# Patient Record
Sex: FEMALE | Race: WHITE | NOT HISPANIC OR LATINO | Employment: FULL TIME | URBAN - METROPOLITAN AREA
[De-identification: names, ages, dates, MRNs, and addresses within clinical notes are randomized per-mention and may not be internally consistent; named-entity substitution may affect disease eponyms.]

---

## 2023-07-05 DIAGNOSIS — I10 HYPERTENSION, ESSENTIAL: ICD-10-CM

## 2023-07-05 PROBLEM — Z00.00 ROUTINE ADULT HEALTH MAINTENANCE: Status: ACTIVE | Noted: 2023-07-05

## 2023-07-05 PROBLEM — Z86.010 HISTORY OF COLON POLYPS: Status: ACTIVE | Noted: 2023-07-05

## 2023-07-05 PROBLEM — K57.30 DIVERTICULOSIS OF COLON: Status: ACTIVE | Noted: 2023-07-05

## 2023-07-05 PROBLEM — Z86.0100 HISTORY OF COLON POLYPS: Status: ACTIVE | Noted: 2023-07-05

## 2023-07-05 PROBLEM — G43.909 MIGRAINE WITHOUT STATUS MIGRAINOSUS, NOT INTRACTABLE: Status: ACTIVE | Noted: 2023-07-05

## 2023-07-05 PROBLEM — Z86.39 H/O THYROID NODULE: Status: ACTIVE | Noted: 2023-07-05

## 2023-07-05 PROBLEM — E11.9 DM (DIABETES MELLITUS) (MULTI): Status: ACTIVE | Noted: 2023-07-05

## 2023-07-05 PROBLEM — E11.9 TYPE 2 DIABETES MELLITUS WITHOUT COMPLICATION, WITHOUT LONG-TERM CURRENT USE OF INSULIN (MULTI): Status: ACTIVE | Noted: 2023-07-05

## 2023-07-05 PROBLEM — D21.9 LEIOMYOMA: Status: ACTIVE | Noted: 2023-07-05

## 2023-07-05 PROBLEM — E88.810 METABOLIC SYNDROME: Status: ACTIVE | Noted: 2023-07-05

## 2023-07-05 PROBLEM — M17.12 OSTEOARTHRITIS OF LEFT KNEE: Status: ACTIVE | Noted: 2023-07-05

## 2023-07-05 PROBLEM — E66.01 CLASS 2 SEVERE OBESITY DUE TO EXCESS CALORIES WITH SERIOUS COMORBIDITY AND BODY MASS INDEX (BMI) OF 35.0 TO 35.9 IN ADULT (MULTI): Status: ACTIVE | Noted: 2023-07-05

## 2023-07-05 PROBLEM — I51.9 DIASTOLIC DYSFUNCTION, LEFT VENTRICLE: Status: ACTIVE | Noted: 2023-07-05

## 2023-07-05 PROBLEM — R73.01 IMPAIRED FASTING GLUCOSE: Status: ACTIVE | Noted: 2023-07-05

## 2023-07-05 PROBLEM — K76.0 FATTY LIVER: Status: ACTIVE | Noted: 2023-07-05

## 2023-07-05 PROBLEM — E66.812 CLASS 2 SEVERE OBESITY DUE TO EXCESS CALORIES WITH SERIOUS COMORBIDITY AND BODY MASS INDEX (BMI) OF 35.0 TO 35.9 IN ADULT: Status: ACTIVE | Noted: 2023-07-05

## 2023-07-05 PROBLEM — K64.0 GRADE I HEMORRHOIDS: Status: ACTIVE | Noted: 2023-07-05

## 2023-07-05 PROBLEM — E55.9 VITAMIN D DEFICIENCY: Status: ACTIVE | Noted: 2023-07-05

## 2023-07-05 PROBLEM — E66.9 OBESITY WITH BODY MASS INDEX 30 OR GREATER: Status: ACTIVE | Noted: 2023-07-05

## 2023-07-05 PROBLEM — E78.2 MIXED HYPERLIPIDEMIA: Status: ACTIVE | Noted: 2023-07-05

## 2023-07-05 PROBLEM — L71.9 ROSACEA: Status: ACTIVE | Noted: 2023-07-05

## 2023-07-05 PROBLEM — E03.9 HYPOTHYROIDISM: Status: ACTIVE | Noted: 2023-07-05

## 2023-07-05 RX ORDER — LISINOPRIL 10 MG/1
10 TABLET ORAL DAILY
Qty: 90 TABLET | Refills: 3 | Status: SHIPPED | OUTPATIENT
Start: 2023-07-05 | End: 2023-08-03 | Stop reason: SINTOL

## 2023-07-05 RX ORDER — LISINOPRIL 10 MG/1
10 TABLET ORAL DAILY
COMMUNITY
End: 2023-07-05 | Stop reason: SDUPTHER

## 2023-08-03 ENCOUNTER — OFFICE VISIT (OUTPATIENT)
Dept: PRIMARY CARE | Facility: CLINIC | Age: 58
End: 2023-08-03
Payer: COMMERCIAL

## 2023-08-03 VITALS
OXYGEN SATURATION: 98 % | TEMPERATURE: 97.5 F | WEIGHT: 209.13 LBS | BODY MASS INDEX: 33.61 KG/M2 | SYSTOLIC BLOOD PRESSURE: 132 MMHG | HEART RATE: 88 BPM | DIASTOLIC BLOOD PRESSURE: 81 MMHG | HEIGHT: 66 IN

## 2023-08-03 DIAGNOSIS — Z00.00 ROUTINE ADULT HEALTH MAINTENANCE: Primary | ICD-10-CM

## 2023-08-03 DIAGNOSIS — I10 HYPERTENSION, ESSENTIAL: ICD-10-CM

## 2023-08-03 DIAGNOSIS — L98.9 CHANGING SKIN LESION: ICD-10-CM

## 2023-08-03 DIAGNOSIS — E03.9 HYPOTHYROIDISM, UNSPECIFIED TYPE: ICD-10-CM

## 2023-08-03 DIAGNOSIS — Z12.31 ENCOUNTER FOR SCREENING MAMMOGRAM FOR BREAST CANCER: ICD-10-CM

## 2023-08-03 DIAGNOSIS — E11.9 TYPE 2 DIABETES MELLITUS WITHOUT COMPLICATION, WITHOUT LONG-TERM CURRENT USE OF INSULIN (MULTI): ICD-10-CM

## 2023-08-03 DIAGNOSIS — Z13.820 SCREENING FOR OSTEOPOROSIS: ICD-10-CM

## 2023-08-03 DIAGNOSIS — Z12.11 SCREENING FOR COLON CANCER: ICD-10-CM

## 2023-08-03 DIAGNOSIS — E55.9 VITAMIN D DEFICIENCY: ICD-10-CM

## 2023-08-03 PROBLEM — M50.90 CERVICAL DISC DISEASE: Status: ACTIVE | Noted: 2023-08-03

## 2023-08-03 PROBLEM — R73.01 IMPAIRED FASTING GLUCOSE: Status: RESOLVED | Noted: 2023-07-05 | Resolved: 2023-08-03

## 2023-08-03 PROBLEM — M47.812 SPONDYLOSIS OF CERVICAL REGION WITHOUT MYELOPATHY OR RADICULOPATHY: Status: ACTIVE | Noted: 2023-08-03

## 2023-08-03 PROCEDURE — 1036F TOBACCO NON-USER: CPT | Performed by: INTERNAL MEDICINE

## 2023-08-03 PROCEDURE — 99396 PREV VISIT EST AGE 40-64: CPT | Performed by: INTERNAL MEDICINE

## 2023-08-03 PROCEDURE — 3079F DIAST BP 80-89 MM HG: CPT | Performed by: INTERNAL MEDICINE

## 2023-08-03 PROCEDURE — 4010F ACE/ARB THERAPY RXD/TAKEN: CPT | Performed by: INTERNAL MEDICINE

## 2023-08-03 PROCEDURE — 3075F SYST BP GE 130 - 139MM HG: CPT | Performed by: INTERNAL MEDICINE

## 2023-08-03 RX ORDER — ROSUVASTATIN CALCIUM 5 MG/1
5 TABLET, COATED ORAL DAILY
Qty: 90 TABLET | Refills: 3 | Status: SHIPPED | OUTPATIENT
Start: 2023-08-03 | End: 2023-08-18

## 2023-08-03 RX ORDER — TIRZEPATIDE 2.5 MG/.5ML
2.5 INJECTION, SOLUTION SUBCUTANEOUS
COMMUNITY
Start: 2023-07-06

## 2023-08-03 RX ORDER — DULAGLUTIDE 1.5 MG/.5ML
1.5 INJECTION, SOLUTION SUBCUTANEOUS
COMMUNITY
End: 2023-08-03 | Stop reason: ALTCHOICE

## 2023-08-03 RX ORDER — GABAPENTIN 100 MG/1
1 CAPSULE ORAL 3 TIMES DAILY
COMMUNITY
Start: 2021-06-24 | End: 2023-08-18

## 2023-08-03 RX ORDER — ROSUVASTATIN CALCIUM 5 MG/1
1 TABLET, COATED ORAL DAILY
COMMUNITY
Start: 2021-10-21 | End: 2023-08-03 | Stop reason: SDUPTHER

## 2023-08-03 RX ORDER — LEVOTHYROXINE SODIUM 25 UG/1
25 TABLET ORAL
COMMUNITY
End: 2023-08-18

## 2023-08-03 RX ORDER — METFORMIN HYDROCHLORIDE 500 MG/1
1 TABLET ORAL 2 TIMES DAILY
COMMUNITY
Start: 2022-02-09

## 2023-08-03 RX ORDER — OLMESARTAN MEDOXOMIL 20 MG/1
20 TABLET ORAL DAILY
Qty: 90 TABLET | Refills: 1 | Status: SHIPPED | OUTPATIENT
Start: 2023-08-03 | End: 2023-08-18

## 2023-08-03 RX ORDER — ACETAMINOPHEN 500 MG
1 TABLET ORAL DAILY
COMMUNITY
Start: 2021-10-20

## 2023-08-03 RX ORDER — LUTEIN 6 MG
1-2 TABLET ORAL DAILY
COMMUNITY
Start: 2018-11-01

## 2023-08-03 ASSESSMENT — PATIENT HEALTH QUESTIONNAIRE - PHQ9
1. LITTLE INTEREST OR PLEASURE IN DOING THINGS: NOT AT ALL
SUM OF ALL RESPONSES TO PHQ9 QUESTIONS 1 AND 2: 0
2. FEELING DOWN, DEPRESSED OR HOPELESS: NOT AT ALL

## 2023-08-03 NOTE — ASSESSMENT & PLAN NOTE
Annual Wellness exam completed   Preventive Health history reviewed:  Vaccines today: none  Labs ordered    Mammogram ordered  BMD ordered  Cscope ordered    Depression Screening done

## 2023-08-03 NOTE — PROGRESS NOTES
Reason for Visit: Annual Physical Exam    Assessment and Plan:  Problem List Items Addressed This Visit          High    Routine adult health maintenance - Primary    Overview     COVID 19 Pfizer vaccine 4/2/21, 4/30/21, 1/11/22  Influenza Vaccine, Split-Non Spec12/4/2003, 11/1/18  Chsngibfg72/3/2016   Shingrix 6/30/22, 9/20/22  Tdap (Age 7+)5/29/2009, 3/29/19  s/p XENIA, ovaries remain  Mamm 11/11/19; 11/13/20, 7/12/22  Cscope 6/20 (3yrs)         Current Assessment & Plan     Annual Wellness exam completed   Preventive Health history reviewed:  Vaccines today: none  Labs ordered    Mammogram ordered  BMD ordered  Cscope ordered    Depression Screening done         Relevant Orders    Urinalysis with Reflex Microscopic    Comprehensive Metabolic Panel    CBC and Auto Differential    Lipid Panel       Medium    Encounter for screening mammogram for breast cancer    Relevant Orders    BI mammo bilateral screening tomosynthesis    Hypertension, essential    Overview      Goal <130/80  On clonidine in past (for vasomotor sx also)  Lisinopril caused cough         Current Assessment & Plan     Change to benicar         Relevant Medications    olmesartan (BENIcar) 20 mg tablet    Other Relevant Orders    Albumin, urine, random    Hypothyroidism    Overview     On Synthroid  Goal Tsh 1-2         Relevant Orders    TSH with reflex to Free T4 if abnormal    Screening for colon cancer    Relevant Orders    Colonoscopy    Screening for osteoporosis    Relevant Orders    XR DEXA bone density    Type 2 diabetes mellitus without complication, without long-term current use of insulin (CMS/[facility])    Overview     on Trulicity and Januvia in past  On Mounjaro and metformin  Endo [facility] manages         Relevant Medications    olmesartan (BENIcar) 20 mg tablet    rosuvastatin (Crestor) 5 mg tablet    Other Relevant Orders    Referral to Ophthalmology    Albumin, urine, random    Vitamin D deficiency    Overview     Comment on above: goal  "40-50;         Relevant Orders    Vitamin D, Total     Other Visit Diagnoses       Changing skin lesion        Relevant Orders    Referral to Dermatology          HPI:  Stopped Clonidine  Started Lisinopril and has cough, dry, months  Doesn't feel sick  Last Hba1c  of 8.1% in July, changed to Mounjaro  Sees Endo    Eyelid had stye  3 weeks  Improving      ROS otherwise negative aside from what was mentioned above in HPI.    Vitals  /81   Pulse 88   Temp 36.4 °C (97.5 °F)   Ht 1.664 m (5' 5.5\")   Wt 94.9 kg (209 lb 2 oz)   SpO2 98%   BMI 34.27 kg/m²   Body mass index is 34.27 kg/m².  Physical Exam  Gen: Alert, NAD  HEENT:  Normal exam.  Neck:  No masses/nodes palpable; Thyroid nontender and without nodules; No KARYN  Respiratory:  Lungs CTAB  CV: RRR  Neuro:  Gross motor and sensory intact  Skin:  erythematous papular lesion on right arm and right breast, small pustule noted also  Breast: No masses or axillary lymphadenopathy  Gyn: Normal pelvic exam: no uterine masses or cervical lesions, or CMT; no vaginal D/C. No ovarian or adnexal masses; No external vaginal or anal/perineal lesions (Pt declined chaperone)    Active Problem List  Patient Active Problem List   Diagnosis    Routine adult health maintenance    H/O thyroid nodule    BMI 34.0-34.9,adult    History of colon polyps    Diastolic dysfunction, left ventricle    Diverticulosis of colon    Fatty liver    Grade I hemorrhoids    Hypertension, essential    Hypothyroidism    Osteoarthritis of left knee    Leiomyoma    Metabolic syndrome    Migraine without status migrainosus, not intractable    Mixed hyperlipidemia    Rosacea    Type 2 diabetes mellitus without complication, without long-term current use of insulin (CMS/[facility])    Vitamin D deficiency    Screening for osteoporosis    Encounter for screening mammogram for breast cancer    Screening for colon cancer    Spondylosis of cervical region without myelopathy or radiculopathy    Cervical disc " disease       Comprehensive Medical/Surgical/Social/Family History  Past Medical History:   Diagnosis Date    H/O abdominal ultrasound     12/16: Fatty infiltration of the liver, without focal hepatic lesion.    H/O cardiovascular stress test     Stress ECHO: 12/16: normal, stage 1 DD    H/O cervical spine x-ray     2019: There is disc space narrowing lower cervical spine at C5/C6 where there are anterior  endplate osteophyte formation    H/O CT scan     7/22: CT calcium score: 0 Short/small hiatal hernia including the gastroesophageal junction    H/O diagnostic ultrasound     US thyroid:6/20: IMPRESSION: Nonvisualization of the previously noted left thyroid lobe nodule, likely represented heterogeneity in the gland 12/19: An approximate 2.8 x 1.5 x 1.4 cm slightly heterogeneous overall hypoechoic nodule is present at the midpole. This nodule is mildly suspicious given these characteristics, and FNA, or relative short-term follow-up would be recommended    H/O magnetic resonance imaging of cervical spine     2016:  Disc herniations C3-4 and C6-7    Personal history of other medical treatment     H/O cervical spine x-ray     Past Surgical History:   Procedure Laterality Date    BLADDER NECK SUSPENSION      Hrzgmsze-Wzucjleum-Tqfyrx procedure    CHOLECYSTECTOMY  10/26/2018    Cholecystectomy    COLONOSCOPY  07/01/2020 6/20: - Diverticulosis in the entire examined colon.                     - External hemorrhoids.                     - One 10 mm polyp at the appendiceal orifice, removed                     with a jumbo cold forceps. Resected and retrieved.    HYSTERECTOMY  10/26/2018    Hysterectomy, TAHBS and Lozqruvf-Hzwasciip-Vhvvcq procedure. Ovaries remain    OTHER SURGICAL HISTORY  08/28/2020    Lateral meniscus repair    OTHER SURGICAL HISTORY  06/24/2021    Left knee scope in August 2020, chondral defect, near full thickness, medial weightbearing surface of the medial femoral condyle.     Social History      Social History Narrative    , 4 kids    Goes by Anais    Works at [employer]    Nonsmoker    Rare ETOH    ---    Family History:    M: Uterine/cervical CA, IFG/DM, ?HTN    F: CAD (MI age 63), HTN, Hyperlipidemia, DM, Glaucoma, CHF    B: CAD age 40    B:    S:    Maunt: thyroid       Allergies and Medications  Diphth,pertus(acell),tetanus; Flu vac 2022 [id number](pf); Glipizide; Lisinopril; Metformin; Pneumococcal 23-yvan ps vaccine; Tizanidine; and Venlafaxine  Current Outpatient Medications   Medication Instructions    cholecalciferol (Vitamin D-3) 50 mcg (2,000 unit) capsule 1 capsule, oral, Daily    gabapentin (Neurontin) 100 mg capsule 1 capsule, oral, 3 times daily    levothyroxine (SYNTHROID, LEVOXYL) 25 mcg, oral, Daily before breakfast    metFORMIN (Glucophage) 500 mg tablet 1 tablet, oral, 2 times daily    Mounjaro 2.5 mg, subcutaneous, Weekly    olmesartan (BENICAR) 20 mg, oral, Daily    rosuvastatin (CRESTOR) 5 mg, oral, Daily    vitamin E acid succinate (vitamin E succinate) 268 mg (400 unit) tablet 1-2 tablets, oral, Daily

## 2023-08-18 DIAGNOSIS — I10 HYPERTENSION, ESSENTIAL: ICD-10-CM

## 2023-08-18 DIAGNOSIS — E11.9 TYPE 2 DIABETES MELLITUS WITHOUT COMPLICATION, WITHOUT LONG-TERM CURRENT USE OF INSULIN (MULTI): ICD-10-CM

## 2023-08-18 DIAGNOSIS — M50.90 CERVICAL DISC DISEASE: Primary | ICD-10-CM

## 2023-08-18 DIAGNOSIS — E03.9 HYPOTHYROIDISM, UNSPECIFIED TYPE: ICD-10-CM

## 2023-08-18 RX ORDER — OLMESARTAN MEDOXOMIL 20 MG/1
20 TABLET ORAL DAILY
Qty: 90 TABLET | Refills: 3 | Status: SHIPPED | OUTPATIENT
Start: 2023-08-18 | End: 2024-08-17

## 2023-08-18 RX ORDER — LEVOTHYROXINE SODIUM 25 UG/1
25 TABLET ORAL
Qty: 90 TABLET | Refills: 3 | Status: SHIPPED | OUTPATIENT
Start: 2023-08-18

## 2023-08-18 RX ORDER — ROSUVASTATIN CALCIUM 5 MG/1
5 TABLET, COATED ORAL DAILY
Qty: 90 TABLET | Refills: 3 | Status: SHIPPED | OUTPATIENT
Start: 2023-08-18 | End: 2024-08-17

## 2023-08-18 RX ORDER — GABAPENTIN 100 MG/1
CAPSULE ORAL 3 TIMES DAILY
Qty: 270 CAPSULE | Refills: 3 | Status: SHIPPED | OUTPATIENT
Start: 2023-08-18 | End: 2023-10-09

## 2023-09-02 PROBLEM — M17.12 LEFT KNEE DJD: Status: ACTIVE | Noted: 2023-09-02

## 2023-09-02 PROBLEM — R09.A2 SENSATION OF LUMP IN THROAT: Status: ACTIVE | Noted: 2023-09-02

## 2023-09-02 PROBLEM — R93.2 ABNORMAL ULTRASOUND OF LIVER: Status: ACTIVE | Noted: 2023-09-02

## 2023-09-02 PROBLEM — M23.90 DERANGEMENT OF KNEE: Status: ACTIVE | Noted: 2023-09-02

## 2023-09-02 PROBLEM — M54.12 CERVICAL RADICULOPATHY: Status: ACTIVE | Noted: 2023-09-02

## 2023-09-02 PROBLEM — L98.9 CHANGING SKIN LESION: Status: ACTIVE | Noted: 2023-09-02

## 2023-09-02 PROBLEM — N95.1 SYMPTOMATIC MENOPAUSAL OR FEMALE CLIMACTERIC STATES: Status: ACTIVE | Noted: 2023-09-02

## 2023-09-02 PROBLEM — L57.0 ACTINIC KERATOSIS: Status: ACTIVE | Noted: 2023-09-02

## 2023-09-02 PROBLEM — G89.29 CHRONIC NECK PAIN: Status: ACTIVE | Noted: 2018-12-06

## 2023-09-02 PROBLEM — M62.89 MUSCLE HYPERTONIA: Status: ACTIVE | Noted: 2023-09-02

## 2023-09-02 PROBLEM — M54.2 CHRONIC NECK PAIN: Status: ACTIVE | Noted: 2018-12-06

## 2023-09-02 PROBLEM — M79.10 MYALGIA: Status: ACTIVE | Noted: 2019-02-11

## 2023-09-02 PROBLEM — R94.5 NONSPECIFIC ABNORMAL RESULTS OF LIVER FUNCTION STUDY: Status: ACTIVE | Noted: 2023-09-02

## 2023-09-02 PROBLEM — M62.81 MUSCLE WEAKNESS OF EXTREMITY: Status: ACTIVE | Noted: 2023-09-02

## 2023-09-02 PROBLEM — R13.10 DYSPHAGIA: Status: ACTIVE | Noted: 2023-09-02

## 2023-09-02 PROBLEM — S83.249A TEAR OF MEDIAL MENISCUS OF KNEE: Status: ACTIVE | Noted: 2023-09-02

## 2023-09-02 RX ORDER — LISINOPRIL 10 MG/1
10 TABLET ORAL DAILY
COMMUNITY

## 2023-10-09 DIAGNOSIS — M50.90 CERVICAL DISC DISEASE: ICD-10-CM

## 2023-10-09 RX ORDER — GABAPENTIN 100 MG/1
100 CAPSULE ORAL 3 TIMES DAILY
Qty: 90 CAPSULE | Refills: 3 | Status: SHIPPED | OUTPATIENT
Start: 2023-10-09

## 2023-10-10 ENCOUNTER — APPOINTMENT (OUTPATIENT)
Dept: ENDOCRINOLOGY | Facility: CLINIC | Age: 58
End: 2023-10-10
Payer: COMMERCIAL

## 2024-09-17 ENCOUNTER — APPOINTMENT (OUTPATIENT)
Dept: PRIMARY CARE | Facility: CLINIC | Age: 59
End: 2024-09-17
Payer: COMMERCIAL

## 2024-09-17 DIAGNOSIS — E11.9 TYPE 2 DIABETES MELLITUS WITHOUT COMPLICATION, WITHOUT LONG-TERM CURRENT USE OF INSULIN (MULTI): ICD-10-CM

## 2024-09-17 DIAGNOSIS — E78.2 MIXED HYPERLIPIDEMIA: ICD-10-CM

## 2024-09-17 DIAGNOSIS — Z00.00 ROUTINE ADULT HEALTH MAINTENANCE: ICD-10-CM

## 2024-09-17 DIAGNOSIS — E55.9 VITAMIN D DEFICIENCY: ICD-10-CM

## 2024-09-17 DIAGNOSIS — E03.8 OTHER SPECIFIED HYPOTHYROIDISM: ICD-10-CM

## 2024-09-18 ENCOUNTER — APPOINTMENT (OUTPATIENT)
Dept: CARDIOLOGY | Facility: HOSPITAL | Age: 59
End: 2024-09-18
Payer: COMMERCIAL

## 2024-09-18 ENCOUNTER — LAB (OUTPATIENT)
Dept: LAB | Facility: LAB | Age: 59
End: 2024-09-18
Payer: COMMERCIAL

## 2024-09-18 ENCOUNTER — APPOINTMENT (OUTPATIENT)
Dept: RADIOLOGY | Facility: HOSPITAL | Age: 59
End: 2024-09-18
Payer: COMMERCIAL

## 2024-09-18 ENCOUNTER — HOSPITAL ENCOUNTER (EMERGENCY)
Facility: HOSPITAL | Age: 59
Discharge: HOME | End: 2024-09-18
Attending: STUDENT IN AN ORGANIZED HEALTH CARE EDUCATION/TRAINING PROGRAM
Payer: COMMERCIAL

## 2024-09-18 VITALS
HEIGHT: 65 IN | RESPIRATION RATE: 18 BRPM | BODY MASS INDEX: 33.32 KG/M2 | OXYGEN SATURATION: 97 % | DIASTOLIC BLOOD PRESSURE: 70 MMHG | HEART RATE: 78 BPM | SYSTOLIC BLOOD PRESSURE: 154 MMHG | TEMPERATURE: 98.4 F | WEIGHT: 200 LBS

## 2024-09-18 DIAGNOSIS — E11.9 TYPE 2 DIABETES MELLITUS WITHOUT COMPLICATION, WITHOUT LONG-TERM CURRENT USE OF INSULIN (MULTI): ICD-10-CM

## 2024-09-18 DIAGNOSIS — R73.9 HYPERGLYCEMIA: Primary | ICD-10-CM

## 2024-09-18 DIAGNOSIS — E78.2 MIXED HYPERLIPIDEMIA: ICD-10-CM

## 2024-09-18 DIAGNOSIS — Z00.00 ROUTINE ADULT HEALTH MAINTENANCE: ICD-10-CM

## 2024-09-18 DIAGNOSIS — E03.8 OTHER SPECIFIED HYPOTHYROIDISM: ICD-10-CM

## 2024-09-18 DIAGNOSIS — E55.9 VITAMIN D DEFICIENCY: ICD-10-CM

## 2024-09-18 LAB
25(OH)D3 SERPL-MCNC: 27 NG/ML (ref 30–100)
ALBUMIN SERPL BCP-MCNC: 4.1 G/DL (ref 3.4–5)
ALBUMIN SERPL BCP-MCNC: 4.3 G/DL (ref 3.4–5)
ALP SERPL-CCNC: 85 U/L (ref 33–110)
ALP SERPL-CCNC: 86 U/L (ref 33–110)
ALT SERPL W P-5'-P-CCNC: 58 U/L (ref 7–45)
ALT SERPL W P-5'-P-CCNC: 70 U/L (ref 7–45)
ANION GAP BLDV CALCULATED.4IONS-SCNC: 9 MMOL/L (ref 10–25)
ANION GAP SERPL CALC-SCNC: 13 MMOL/L (ref 10–20)
ANION GAP SERPL CALC-SCNC: 13 MMOL/L (ref 10–20)
APPEARANCE UR: CLEAR
APPEARANCE UR: CLEAR
AST SERPL W P-5'-P-CCNC: 39 U/L (ref 9–39)
AST SERPL W P-5'-P-CCNC: 48 U/L (ref 9–39)
B-OH-BUTYR SERPL-SCNC: 0.21 MMOL/L (ref 0.02–0.27)
B-OH-BUTYR SERPL-SCNC: 0.46 MMOL/L (ref 0.02–0.27)
BASE EXCESS BLDV CALC-SCNC: 2.7 MMOL/L (ref -2–3)
BASOPHILS # BLD AUTO: 0.03 X10*3/UL (ref 0–0.1)
BASOPHILS # BLD AUTO: 0.03 X10*3/UL (ref 0–0.1)
BASOPHILS NFR BLD AUTO: 0.5 %
BASOPHILS NFR BLD AUTO: 0.7 %
BILIRUB SERPL-MCNC: 0.6 MG/DL (ref 0–1.2)
BILIRUB SERPL-MCNC: 0.9 MG/DL (ref 0–1.2)
BILIRUB UR STRIP.AUTO-MCNC: NEGATIVE MG/DL
BILIRUB UR STRIP.AUTO-MCNC: NEGATIVE MG/DL
BODY TEMPERATURE: ABNORMAL
BUN SERPL-MCNC: 13 MG/DL (ref 6–23)
BUN SERPL-MCNC: 9 MG/DL (ref 6–23)
CA-I BLDV-SCNC: 1.16 MMOL/L (ref 1.1–1.33)
CALCIUM SERPL-MCNC: 9.2 MG/DL (ref 8.6–10.3)
CALCIUM SERPL-MCNC: 9.2 MG/DL (ref 8.6–10.3)
CARDIAC TROPONIN I PNL SERPL HS: <3 NG/L (ref 0–13)
CHLORIDE BLDV-SCNC: 98 MMOL/L (ref 98–107)
CHLORIDE SERPL-SCNC: 97 MMOL/L (ref 98–107)
CHLORIDE SERPL-SCNC: 99 MMOL/L (ref 98–107)
CHOLEST SERPL-MCNC: 192 MG/DL (ref 0–199)
CHOLESTEROL/HDL RATIO: 6
CO2 SERPL-SCNC: 27 MMOL/L (ref 21–32)
CO2 SERPL-SCNC: 28 MMOL/L (ref 21–32)
COLOR UR: ABNORMAL
COLOR UR: COLORLESS
CREAT SERPL-MCNC: 0.74 MG/DL (ref 0.5–1.05)
CREAT SERPL-MCNC: 0.85 MG/DL (ref 0.5–1.05)
CRITICAL CALL TIME: 1533
CRITICAL CALLED BY: ABNORMAL
CRITICAL CALLED TO: ABNORMAL
CRITICAL READ BACK: ABNORMAL
D DIMER PPP FEU-MCNC: 283 NG/ML FEU
EGFRCR SERPLBLD CKD-EPI 2021: 81 ML/MIN/1.73M*2
EGFRCR SERPLBLD CKD-EPI 2021: >90 ML/MIN/1.73M*2
EOSINOPHIL # BLD AUTO: 0.05 X10*3/UL (ref 0–0.7)
EOSINOPHIL # BLD AUTO: 0.08 X10*3/UL (ref 0–0.7)
EOSINOPHIL NFR BLD AUTO: 0.8 %
EOSINOPHIL NFR BLD AUTO: 1.8 %
ERYTHROCYTE [DISTWIDTH] IN BLOOD BY AUTOMATED COUNT: 12.5 % (ref 11.5–14.5)
ERYTHROCYTE [DISTWIDTH] IN BLOOD BY AUTOMATED COUNT: 12.6 % (ref 11.5–14.5)
EST. AVERAGE GLUCOSE BLD GHB EST-MCNC: 375 MG/DL
GLUCOSE BLD MANUAL STRIP-MCNC: 276 MG/DL (ref 74–99)
GLUCOSE BLD MANUAL STRIP-MCNC: 433 MG/DL (ref 74–99)
GLUCOSE BLDV-MCNC: 492 MG/DL (ref 74–99)
GLUCOSE SERPL-MCNC: 328 MG/DL (ref 74–99)
GLUCOSE SERPL-MCNC: 458 MG/DL (ref 74–99)
GLUCOSE UR STRIP.AUTO-MCNC: ABNORMAL MG/DL
GLUCOSE UR STRIP.AUTO-MCNC: ABNORMAL MG/DL
HBA1C MFR BLD: 14.7 %
HCO3 BLDV-SCNC: 28.5 MMOL/L (ref 22–26)
HCT VFR BLD AUTO: 42.3 % (ref 36–46)
HCT VFR BLD AUTO: 42.9 % (ref 36–46)
HCT VFR BLD EST: 45 % (ref 36–46)
HDLC SERPL-MCNC: 32 MG/DL
HGB BLD-MCNC: 14.4 G/DL (ref 12–16)
HGB BLD-MCNC: 14.6 G/DL (ref 12–16)
HGB BLDV-MCNC: 15 G/DL (ref 12–16)
HOLD SPECIMEN: NORMAL
HYALINE CASTS #/AREA URNS AUTO: ABNORMAL /LPF
IMM GRANULOCYTES # BLD AUTO: 0.01 X10*3/UL (ref 0–0.7)
IMM GRANULOCYTES # BLD AUTO: 0.03 X10*3/UL (ref 0–0.7)
IMM GRANULOCYTES NFR BLD AUTO: 0.2 % (ref 0–0.9)
IMM GRANULOCYTES NFR BLD AUTO: 0.5 % (ref 0–0.9)
INHALED O2 CONCENTRATION: 21 %
INR PPP: 1 (ref 0.9–1.1)
KETONES UR STRIP.AUTO-MCNC: ABNORMAL MG/DL
KETONES UR STRIP.AUTO-MCNC: NEGATIVE MG/DL
LACTATE BLDV-SCNC: 1.8 MMOL/L (ref 0.4–2)
LDLC SERPL CALC-MCNC: ABNORMAL MG/DL
LEUKOCYTE ESTERASE UR QL STRIP.AUTO: ABNORMAL
LEUKOCYTE ESTERASE UR QL STRIP.AUTO: NEGATIVE
LIPASE SERPL-CCNC: 39 U/L (ref 9–82)
LYMPHOCYTES # BLD AUTO: 1.75 X10*3/UL (ref 1.2–4.8)
LYMPHOCYTES # BLD AUTO: 1.88 X10*3/UL (ref 1.2–4.8)
LYMPHOCYTES NFR BLD AUTO: 31.2 %
LYMPHOCYTES NFR BLD AUTO: 39.4 %
MCH RBC QN AUTO: 29.2 PG (ref 26–34)
MCH RBC QN AUTO: 29.3 PG (ref 26–34)
MCHC RBC AUTO-ENTMCNC: 34 G/DL (ref 32–36)
MCHC RBC AUTO-ENTMCNC: 34 G/DL (ref 32–36)
MCV RBC AUTO: 86 FL (ref 80–100)
MCV RBC AUTO: 86 FL (ref 80–100)
MONOCYTES # BLD AUTO: 0.32 X10*3/UL (ref 0.1–1)
MONOCYTES # BLD AUTO: 0.34 X10*3/UL (ref 0.1–1)
MONOCYTES NFR BLD AUTO: 5.6 %
MONOCYTES NFR BLD AUTO: 7.2 %
MUCOUS THREADS #/AREA URNS AUTO: ABNORMAL /LPF
NEUTROPHILS # BLD AUTO: 2.25 X10*3/UL (ref 1.2–7.7)
NEUTROPHILS # BLD AUTO: 3.7 X10*3/UL (ref 1.2–7.7)
NEUTROPHILS NFR BLD AUTO: 50.7 %
NEUTROPHILS NFR BLD AUTO: 61.4 %
NITRITE UR QL STRIP.AUTO: NEGATIVE
NITRITE UR QL STRIP.AUTO: NEGATIVE
NON HDL CHOLESTEROL: 160 MG/DL (ref 0–149)
NRBC BLD-RTO: 0 /100 WBCS (ref 0–0)
NRBC BLD-RTO: 0 /100 WBCS (ref 0–0)
OSMOLALITY SERPL: 297 MOSM/KG (ref 280–300)
OXYHGB MFR BLDV: 52.3 % (ref 45–75)
PCO2 BLDV: 47 MM HG (ref 41–51)
PH BLDV: 7.39 PH (ref 7.33–7.43)
PH UR STRIP.AUTO: 5 [PH]
PH UR STRIP.AUTO: 6 [PH]
PLATELET # BLD AUTO: 235 X10*3/UL (ref 150–450)
PLATELET # BLD AUTO: 248 X10*3/UL (ref 150–450)
PO2 BLDV: 32 MM HG (ref 35–45)
POTASSIUM BLDV-SCNC: 3.9 MMOL/L (ref 3.5–5.3)
POTASSIUM SERPL-SCNC: 3.9 MMOL/L (ref 3.5–5.3)
POTASSIUM SERPL-SCNC: 4.2 MMOL/L (ref 3.5–5.3)
PROT SERPL-MCNC: 7 G/DL (ref 6.4–8.2)
PROT SERPL-MCNC: 7.2 G/DL (ref 6.4–8.2)
PROT UR STRIP.AUTO-MCNC: NEGATIVE MG/DL
PROT UR STRIP.AUTO-MCNC: NEGATIVE MG/DL
PROTHROMBIN TIME: 10.9 SECONDS (ref 9.8–12.8)
RBC # BLD AUTO: 4.91 X10*6/UL (ref 4–5.2)
RBC # BLD AUTO: 5 X10*6/UL (ref 4–5.2)
RBC # UR STRIP.AUTO: NEGATIVE /UL
RBC # UR STRIP.AUTO: NEGATIVE /UL
RBC #/AREA URNS AUTO: ABNORMAL /HPF
SAO2 % BLDV: 53 % (ref 45–75)
SODIUM BLDV-SCNC: 132 MMOL/L (ref 136–145)
SODIUM SERPL-SCNC: 133 MMOL/L (ref 136–145)
SODIUM SERPL-SCNC: 136 MMOL/L (ref 136–145)
SP GR UR STRIP.AUTO: 1.03
SP GR UR STRIP.AUTO: 1.03
SQUAMOUS #/AREA URNS AUTO: ABNORMAL /HPF
T4 FREE SERPL-MCNC: 0.96 NG/DL (ref 0.61–1.12)
TRIGL SERPL-MCNC: 451 MG/DL (ref 0–149)
TSH SERPL-ACNC: 5.8 MIU/L (ref 0.44–3.98)
UROBILINOGEN UR STRIP.AUTO-MCNC: NORMAL MG/DL
UROBILINOGEN UR STRIP.AUTO-MCNC: NORMAL MG/DL
VLDL: ABNORMAL
WBC # BLD AUTO: 4.4 X10*3/UL (ref 4.4–11.3)
WBC # BLD AUTO: 6 X10*3/UL (ref 4.4–11.3)
WBC #/AREA URNS AUTO: ABNORMAL /HPF

## 2024-09-18 PROCEDURE — 36415 COLL VENOUS BLD VENIPUNCTURE: CPT

## 2024-09-18 PROCEDURE — 82947 ASSAY GLUCOSE BLOOD QUANT: CPT

## 2024-09-18 PROCEDURE — 99283 EMERGENCY DEPT VISIT LOW MDM: CPT | Mod: 25

## 2024-09-18 PROCEDURE — 82010 KETONE BODYS QUAN: CPT

## 2024-09-18 PROCEDURE — 83690 ASSAY OF LIPASE: CPT | Performed by: STUDENT IN AN ORGANIZED HEALTH CARE EDUCATION/TRAINING PROGRAM

## 2024-09-18 PROCEDURE — 2500000004 HC RX 250 GENERAL PHARMACY W/ HCPCS (ALT 636 FOR OP/ED)

## 2024-09-18 PROCEDURE — 84132 ASSAY OF SERUM POTASSIUM: CPT | Performed by: STUDENT IN AN ORGANIZED HEALTH CARE EDUCATION/TRAINING PROGRAM

## 2024-09-18 PROCEDURE — 83036 HEMOGLOBIN GLYCOSYLATED A1C: CPT

## 2024-09-18 PROCEDURE — 36415 COLL VENOUS BLD VENIPUNCTURE: CPT | Performed by: STUDENT IN AN ORGANIZED HEALTH CARE EDUCATION/TRAINING PROGRAM

## 2024-09-18 PROCEDURE — 85610 PROTHROMBIN TIME: CPT | Performed by: STUDENT IN AN ORGANIZED HEALTH CARE EDUCATION/TRAINING PROGRAM

## 2024-09-18 PROCEDURE — 80053 COMPREHEN METABOLIC PANEL: CPT

## 2024-09-18 PROCEDURE — 2500000002 HC RX 250 W HCPCS SELF ADMINISTERED DRUGS (ALT 637 FOR MEDICARE OP, ALT 636 FOR OP/ED)

## 2024-09-18 PROCEDURE — 83930 ASSAY OF BLOOD OSMOLALITY: CPT

## 2024-09-18 PROCEDURE — 81003 URINALYSIS AUTO W/O SCOPE: CPT

## 2024-09-18 PROCEDURE — 71045 X-RAY EXAM CHEST 1 VIEW: CPT | Performed by: RADIOLOGY

## 2024-09-18 PROCEDURE — 84439 ASSAY OF FREE THYROXINE: CPT

## 2024-09-18 PROCEDURE — 81001 URINALYSIS AUTO W/SCOPE: CPT

## 2024-09-18 PROCEDURE — 84132 ASSAY OF SERUM POTASSIUM: CPT | Mod: 59 | Performed by: STUDENT IN AN ORGANIZED HEALTH CARE EDUCATION/TRAINING PROGRAM

## 2024-09-18 PROCEDURE — 71045 X-RAY EXAM CHEST 1 VIEW: CPT

## 2024-09-18 PROCEDURE — 96361 HYDRATE IV INFUSION ADD-ON: CPT

## 2024-09-18 PROCEDURE — 84484 ASSAY OF TROPONIN QUANT: CPT | Performed by: STUDENT IN AN ORGANIZED HEALTH CARE EDUCATION/TRAINING PROGRAM

## 2024-09-18 PROCEDURE — 93005 ELECTROCARDIOGRAM TRACING: CPT

## 2024-09-18 PROCEDURE — 82306 VITAMIN D 25 HYDROXY: CPT

## 2024-09-18 PROCEDURE — 84443 ASSAY THYROID STIM HORMONE: CPT

## 2024-09-18 PROCEDURE — 85379 FIBRIN DEGRADATION QUANT: CPT | Performed by: STUDENT IN AN ORGANIZED HEALTH CARE EDUCATION/TRAINING PROGRAM

## 2024-09-18 PROCEDURE — 85025 COMPLETE CBC W/AUTO DIFF WBC: CPT

## 2024-09-18 PROCEDURE — 85025 COMPLETE CBC W/AUTO DIFF WBC: CPT | Performed by: STUDENT IN AN ORGANIZED HEALTH CARE EDUCATION/TRAINING PROGRAM

## 2024-09-18 PROCEDURE — 82010 KETONE BODYS QUAN: CPT | Performed by: STUDENT IN AN ORGANIZED HEALTH CARE EDUCATION/TRAINING PROGRAM

## 2024-09-18 PROCEDURE — 96360 HYDRATION IV INFUSION INIT: CPT

## 2024-09-18 PROCEDURE — 80061 LIPID PANEL: CPT

## 2024-09-18 RX ORDER — DEXTROSE 50 % IN WATER (D50W) INTRAVENOUS SYRINGE
12.5
Status: DISCONTINUED | OUTPATIENT
Start: 2024-09-18 | End: 2024-09-18 | Stop reason: HOSPADM

## 2024-09-18 RX ORDER — INSULIN LISPRO 100 [IU]/ML
5 INJECTION, SOLUTION INTRAVENOUS; SUBCUTANEOUS ONCE
Status: COMPLETED | OUTPATIENT
Start: 2024-09-18 | End: 2024-09-18

## 2024-09-18 RX ORDER — DEXTROSE 50 % IN WATER (D50W) INTRAVENOUS SYRINGE
25
Status: DISCONTINUED | OUTPATIENT
Start: 2024-09-18 | End: 2024-09-18 | Stop reason: HOSPADM

## 2024-09-18 ASSESSMENT — LIFESTYLE VARIABLES
TOTAL SCORE: 0
HAVE YOU EVER FELT YOU SHOULD CUT DOWN ON YOUR DRINKING: NO
HAVE PEOPLE ANNOYED YOU BY CRITICIZING YOUR DRINKING: NO
EVER FELT BAD OR GUILTY ABOUT YOUR DRINKING: NO
EVER HAD A DRINK FIRST THING IN THE MORNING TO STEADY YOUR NERVES TO GET RID OF A HANGOVER: NO

## 2024-09-18 ASSESSMENT — COLUMBIA-SUICIDE SEVERITY RATING SCALE - C-SSRS
6. HAVE YOU EVER DONE ANYTHING, STARTED TO DO ANYTHING, OR PREPARED TO DO ANYTHING TO END YOUR LIFE?: NO
1. IN THE PAST MONTH, HAVE YOU WISHED YOU WERE DEAD OR WISHED YOU COULD GO TO SLEEP AND NOT WAKE UP?: NO
2. HAVE YOU ACTUALLY HAD ANY THOUGHTS OF KILLING YOURSELF?: NO

## 2024-09-18 ASSESSMENT — PAIN SCALES - GENERAL
PAINLEVEL_OUTOF10: 0 - NO PAIN
PAINLEVEL_OUTOF10: 0 - NO PAIN

## 2024-09-18 ASSESSMENT — PAIN - FUNCTIONAL ASSESSMENT
PAIN_FUNCTIONAL_ASSESSMENT: 0-10
PAIN_FUNCTIONAL_ASSESSMENT: 0-10

## 2024-09-18 NOTE — DISCHARGE INSTRUCTIONS
Please continue to take your metformin medication.  Please return close ED if develop any worsening symptoms including excessive nausea, vomiting, vision change, confusion, increased thirst, or numbness/tingling.  Please follow-up with your PCP in the next week.

## 2024-09-18 NOTE — ED PROVIDER NOTES
HPI   Chief Complaint   Patient presents with    abnormal labs     MD sent pt in to ED for ketones in urine, pt states she just got over covid and feels foggy, but has no other complaints    Hyperglycemia     Pt blood sugar 433 in triage       Patient is a 58-year-old female with history of diabetes presenting to [facility] ED after recent abnormal lab work at her PCPs office.  Patient reports that there were ketones noted in her urine and elevated glucose.  Patient was advised to come to ED for further evaluation management of possible DKA.  Patient denies any active chest pain, shortness of breath, nausea, vomiting, diarrhea, abdominal pain, fever, chills, dizziness, headache, or weakness.  Of note, patient does have some lethargy from recent COVID infection.  Patient also reports that she has not been compliant with her medications for the last 6 months as well.                Patient History   Past Medical History:   Diagnosis Date    H/O abdominal ultrasound     12/16: Fatty infiltration of the liver, without focal hepatic lesion.    H/O cardiovascular stress test     Stress ECHO: 12/16: normal, stage 1 DD    H/O cervical spine x-ray     2019: There is disc space narrowing lower cervical spine at C5/C6 where there are anterior  endplate osteophyte formation    H/O CT scan     7/22: CT calcium score: 0 Short/small hiatal hernia including the gastroesophageal junction    H/O diagnostic ultrasound     US thyroid:6/20: IMPRESSION: Nonvisualization of the previously noted left thyroid lobe nodule, likely represented heterogeneity in the gland 12/19: An approximate 2.8 x 1.5 x 1.4 cm slightly heterogeneous overall hypoechoic nodule is present at the midpole. This nodule is mildly suspicious given these characteristics, and FNA, or relative short-term follow-up would be recommended    H/O magnetic resonance imaging of cervical spine     2016:  Disc herniations C3-4 and C6-7    Personal history of other medical  treatment     H/O cervical spine x-ray     Past Surgical History:   Procedure Laterality Date    BLADDER NECK SUSPENSION      Tbiptubo-Ruzthesjy-Gugtiy procedure    CHOLECYSTECTOMY  10/26/2018    Cholecystectomy    COLONOSCOPY  07/01/2020 6/20: - Diverticulosis in the entire examined colon.                     - External hemorrhoids.                     - One 10 mm polyp at the appendiceal orifice, removed                     with a jumbo cold forceps. Resected and retrieved.    HYSTERECTOMY  10/26/2018    Hysterectomy, TAHBS and Nzyedypn-Kkgkrwraw-Unndeq procedure. Ovaries remain    OTHER SURGICAL HISTORY  08/28/2020    Lateral meniscus repair    OTHER SURGICAL HISTORY  06/24/2021    Left knee scope in August 2020, chondral defect, near full thickness, medial weightbearing surface of the medial femoral condyle.     Family History   Problem Relation Name Age of Onset    Uterine cancer Mother      Cervical cancer Mother      Hypertension Mother      Coronary artery disease Father      Hypertension Father      Hyperlipidemia Father      Diabetes Father      Glaucoma Father      Heart failure Father      Coronary artery disease Brother      Other (thyroid) Mother's Sister       Social History     Tobacco Use    Smoking status: Never    Smokeless tobacco: Never   Substance Use Topics    Alcohol use: Yes     Comment: rarely    Drug use: Never       Physical Exam   ED Triage Vitals [09/18/24 1508]   Temperature Heart Rate Respirations BP   36.9 °C (98.4 °F) (!) 107 18 (!) 169/99      Pulse Ox Temp Source Heart Rate Source Patient Position   99 % Temporal Monitor Sitting      BP Location FiO2 (%)     Right arm --       Physical Exam  Constitutional:       Appearance: Normal appearance. She is normal weight.   HENT:      Head: Normocephalic and atraumatic.      Nose: Nose normal.      Mouth/Throat:      Mouth: Mucous membranes are moist.      Pharynx: Oropharynx is clear.   Eyes:      Extraocular Movements: Extraocular  movements intact.      Conjunctiva/sclera: Conjunctivae normal.      Pupils: Pupils are equal, round, and reactive to light.   Cardiovascular:      Rate and Rhythm: Normal rate and regular rhythm.      Pulses: Normal pulses.      Heart sounds: Normal heart sounds.   Pulmonary:      Effort: Pulmonary effort is normal.      Breath sounds: Normal breath sounds.   Abdominal:      General: Abdomen is flat. Bowel sounds are normal.      Palpations: Abdomen is soft.   Musculoskeletal:         General: Normal range of motion.      Cervical back: Normal range of motion and neck supple.   Skin:     General: Skin is warm and dry.      Capillary Refill: Capillary refill takes less than 2 seconds.   Neurological:      General: No focal deficit present.      Mental Status: She is alert and oriented to person, place, and time. Mental status is at baseline.   Psychiatric:         Mood and Affect: Mood normal.         Behavior: Behavior normal.           ED Course & MDM   Diagnoses as of 09/19/24 1348   Hyperglycemia                 No data recorded     Ryan Coma Scale Score: 15 (09/18/24 1509 : Alanna Murdock RN)                           Medical Decision Making  Patient is a 58 y.o. female who presents to [facility] ED for abnormal labs (MD sent pt in to ED for ketones in urine, pt states she just got over covid and feels foggy, but has no other complaints) and Hyperglycemia (Pt blood sugar 433 in triage). On initial ED evaluation, patient found to be in no acute distress. Per HPI, concern to evaluate and treat for hyperglycemia, DKA rule out.  Obtaining metabolic labs including CBC, CMP, hydroxybutyrate, generalized weakness workup including cardiac labs.  Patient is cold glucose, on ED arrival was 458.  Patient hydroxybutyrate was 0.21 and pH of 7.39 on VBG, reassuring for no acute DKA process.  CBC showed leukocytosis, concerning for infection.  Patient lipase was within normal limits.  Patient UA negative for ketones.   Patient bolused with 1 L LR fluids.  Patient hyperglycemia treated with 5 units Humalog insulin.  Patient glucose came down to 276.  Patient asymptomatic at this time.  Patient advised to continue taking her metformin.  Confirmed with patient, that she had sufficient supply and outpatient follow-up.  Patient understood these recommendations.  Patient to follow-up with her PCP.  Diagnostic findings and treatment plan discussed with patient.  Patient amenable to plan.    Patient to follow up with PCP outpatient. Anticipatory guidance and return precautions provided.  Patient otherwise stable for discharge.          Procedure  Procedures     Mike Allen MD  Resident  09/19/24 9024

## 2024-09-19 ENCOUNTER — APPOINTMENT (OUTPATIENT)
Dept: PRIMARY CARE | Facility: CLINIC | Age: 59
End: 2024-09-19
Payer: COMMERCIAL

## 2024-09-19 ENCOUNTER — OFFICE VISIT (OUTPATIENT)
Dept: PRIMARY CARE | Facility: CLINIC | Age: 59
End: 2024-09-19
Payer: COMMERCIAL

## 2024-09-19 ENCOUNTER — TELEPHONE (OUTPATIENT)
Dept: PRIMARY CARE | Facility: CLINIC | Age: 59
End: 2024-09-19

## 2024-09-19 ENCOUNTER — TELEPHONE (OUTPATIENT)
Dept: PHARMACY | Facility: HOSPITAL | Age: 59
End: 2024-09-19

## 2024-09-19 VITALS
DIASTOLIC BLOOD PRESSURE: 74 MMHG | HEIGHT: 65 IN | WEIGHT: 193.25 LBS | SYSTOLIC BLOOD PRESSURE: 120 MMHG | BODY MASS INDEX: 32.2 KG/M2 | OXYGEN SATURATION: 97 % | HEART RATE: 92 BPM | TEMPERATURE: 98.3 F

## 2024-09-19 DIAGNOSIS — Z12.11 SCREENING FOR COLON CANCER: ICD-10-CM

## 2024-09-19 DIAGNOSIS — E11.9 TYPE 2 DIABETES MELLITUS WITHOUT COMPLICATION, WITHOUT LONG-TERM CURRENT USE OF INSULIN (MULTI): ICD-10-CM

## 2024-09-19 DIAGNOSIS — Z79.899 MEDICATION MANAGEMENT: ICD-10-CM

## 2024-09-19 DIAGNOSIS — M50.90 CERVICAL DISC DISEASE: ICD-10-CM

## 2024-09-19 DIAGNOSIS — H01.9 INFLAMMATORY LESION OF EYELID: ICD-10-CM

## 2024-09-19 DIAGNOSIS — Z00.00 ROUTINE ADULT HEALTH MAINTENANCE: Primary | ICD-10-CM

## 2024-09-19 DIAGNOSIS — B37.31 CANDIDA VAGINITIS: ICD-10-CM

## 2024-09-19 DIAGNOSIS — K76.0 FATTY LIVER: ICD-10-CM

## 2024-09-19 DIAGNOSIS — R13.12 OROPHARYNGEAL DYSPHAGIA: ICD-10-CM

## 2024-09-19 DIAGNOSIS — Z12.31 ENCOUNTER FOR SCREENING MAMMOGRAM FOR BREAST CANCER: ICD-10-CM

## 2024-09-19 DIAGNOSIS — E55.9 VITAMIN D DEFICIENCY: ICD-10-CM

## 2024-09-19 DIAGNOSIS — I10 HYPERTENSION, ESSENTIAL: ICD-10-CM

## 2024-09-19 DIAGNOSIS — E03.9 HYPOTHYROIDISM, UNSPECIFIED TYPE: ICD-10-CM

## 2024-09-19 PROBLEM — M62.81 MUSCLE WEAKNESS OF EXTREMITY: Status: RESOLVED | Noted: 2023-09-02 | Resolved: 2024-09-19

## 2024-09-19 PROBLEM — L98.9 CHANGING SKIN LESION: Status: RESOLVED | Noted: 2023-09-02 | Resolved: 2024-09-19

## 2024-09-19 PROBLEM — M62.89 MUSCLE HYPERTONIA: Status: RESOLVED | Noted: 2023-09-02 | Resolved: 2024-09-19

## 2024-09-19 PROBLEM — R94.5 NONSPECIFIC ABNORMAL RESULTS OF LIVER FUNCTION STUDY: Status: RESOLVED | Noted: 2023-09-02 | Resolved: 2024-09-19

## 2024-09-19 PROBLEM — M79.10 MYALGIA: Status: RESOLVED | Noted: 2019-02-11 | Resolved: 2024-09-19

## 2024-09-19 PROBLEM — G89.29 CHRONIC NECK PAIN: Status: RESOLVED | Noted: 2018-12-06 | Resolved: 2024-09-19

## 2024-09-19 PROBLEM — S83.249A TEAR OF MEDIAL MENISCUS OF KNEE: Status: RESOLVED | Noted: 2023-09-02 | Resolved: 2024-09-19

## 2024-09-19 PROBLEM — M54.2 CHRONIC NECK PAIN: Status: RESOLVED | Noted: 2018-12-06 | Resolved: 2024-09-19

## 2024-09-19 PROBLEM — M23.90 DERANGEMENT OF KNEE: Status: RESOLVED | Noted: 2023-09-02 | Resolved: 2024-09-19

## 2024-09-19 PROBLEM — M17.12 LEFT KNEE DJD: Status: RESOLVED | Noted: 2023-09-02 | Resolved: 2024-09-19

## 2024-09-19 PROBLEM — R09.A2 SENSATION OF LUMP IN THROAT: Status: RESOLVED | Noted: 2023-09-02 | Resolved: 2024-09-19

## 2024-09-19 PROBLEM — R93.2 ABNORMAL ULTRASOUND OF LIVER: Status: RESOLVED | Noted: 2023-09-02 | Resolved: 2024-09-19

## 2024-09-19 PROBLEM — L57.0 ACTINIC KERATOSIS: Status: RESOLVED | Noted: 2023-09-02 | Resolved: 2024-09-19

## 2024-09-19 PROBLEM — E88.810 METABOLIC SYNDROME: Status: RESOLVED | Noted: 2023-07-05 | Resolved: 2024-09-19

## 2024-09-19 LAB
ATRIAL RATE: 110 BPM
HOLD SPECIMEN: NORMAL
P AXIS: 32 DEGREES
P OFFSET: 188 MS
P ONSET: 154 MS
PR INTERVAL: 128 MS
Q ONSET: 218 MS
QRS COUNT: 19 BEATS
QRS DURATION: 82 MS
QT INTERVAL: 344 MS
QTC CALCULATION(BAZETT): 465 MS
QTC FREDERICIA: 421 MS
R AXIS: 68 DEGREES
T AXIS: 17 DEGREES
T OFFSET: 390 MS
VENTRICULAR RATE: 110 BPM

## 2024-09-19 PROCEDURE — 99214 OFFICE O/P EST MOD 30 MIN: CPT | Performed by: INTERNAL MEDICINE

## 2024-09-19 PROCEDURE — 1036F TOBACCO NON-USER: CPT | Performed by: INTERNAL MEDICINE

## 2024-09-19 PROCEDURE — 99396 PREV VISIT EST AGE 40-64: CPT | Performed by: INTERNAL MEDICINE

## 2024-09-19 PROCEDURE — 3046F HEMOGLOBIN A1C LEVEL >9.0%: CPT | Performed by: INTERNAL MEDICINE

## 2024-09-19 PROCEDURE — 3074F SYST BP LT 130 MM HG: CPT | Performed by: INTERNAL MEDICINE

## 2024-09-19 PROCEDURE — 3008F BODY MASS INDEX DOCD: CPT | Performed by: INTERNAL MEDICINE

## 2024-09-19 PROCEDURE — 3078F DIAST BP <80 MM HG: CPT | Performed by: INTERNAL MEDICINE

## 2024-09-19 PROCEDURE — 4010F ACE/ARB THERAPY RXD/TAKEN: CPT | Performed by: INTERNAL MEDICINE

## 2024-09-19 RX ORDER — TIRZEPATIDE 2.5 MG/.5ML
2.5 INJECTION, SOLUTION SUBCUTANEOUS
Qty: 2 ML | Refills: 0 | Status: SHIPPED | OUTPATIENT
Start: 2024-09-22 | End: 2024-09-19 | Stop reason: SDUPTHER

## 2024-09-19 RX ORDER — TIRZEPATIDE 2.5 MG/.5ML
2.5 INJECTION, SOLUTION SUBCUTANEOUS
Qty: 2 ML | Refills: 0 | Status: SHIPPED | OUTPATIENT
Start: 2024-09-22

## 2024-09-19 RX ORDER — METFORMIN HYDROCHLORIDE 500 MG/1
500 TABLET ORAL 2 TIMES DAILY
Qty: 180 TABLET | Refills: 3 | Status: SHIPPED | OUTPATIENT
Start: 2024-09-19

## 2024-09-19 RX ORDER — FLUCONAZOLE 150 MG/1
TABLET ORAL
Qty: 2 TABLET | Refills: 0 | Status: SHIPPED | OUTPATIENT
Start: 2024-09-19

## 2024-09-19 RX ORDER — ERYTHROMYCIN 5 MG/G
OINTMENT OPHTHALMIC 4 TIMES DAILY
Qty: 3.5 G | Refills: 0 | Status: SHIPPED | OUTPATIENT
Start: 2024-09-19 | End: 2024-09-26

## 2024-09-19 RX ORDER — ROSUVASTATIN CALCIUM 5 MG/1
5 TABLET, COATED ORAL DAILY
Qty: 90 TABLET | Refills: 3 | Status: SHIPPED | OUTPATIENT
Start: 2024-09-19 | End: 2025-09-19

## 2024-09-19 RX ORDER — INSULIN GLARGINE-YFGN 100 [IU]/ML
8 INJECTION, SOLUTION SUBCUTANEOUS EVERY 24 HOURS
Qty: 28.8 ML | Refills: 0 | Status: SHIPPED | OUTPATIENT
Start: 2024-09-19 | End: 2024-09-19

## 2024-09-19 RX ORDER — LEVOTHYROXINE SODIUM 25 UG/1
25 TABLET ORAL
Qty: 90 TABLET | Refills: 3 | Status: SHIPPED | OUTPATIENT
Start: 2024-09-19

## 2024-09-19 RX ORDER — OLMESARTAN MEDOXOMIL 20 MG/1
20 TABLET ORAL DAILY
Qty: 90 TABLET | Refills: 3 | Status: SHIPPED | OUTPATIENT
Start: 2024-09-19 | End: 2025-09-19

## 2024-09-19 RX ORDER — GABAPENTIN 100 MG/1
100 CAPSULE ORAL 3 TIMES DAILY
Qty: 270 CAPSULE | Refills: 3 | Status: SHIPPED | OUTPATIENT
Start: 2024-09-19

## 2024-09-19 RX ORDER — INSULIN GLARGINE-YFGN 100 [IU]/ML
15 INJECTION, SOLUTION SUBCUTANEOUS EVERY 24 HOURS
Qty: 54 ML | Refills: 0 | Status: SHIPPED | OUTPATIENT
Start: 2024-09-19 | End: 2025-09-19

## 2024-09-19 ASSESSMENT — PATIENT HEALTH QUESTIONNAIRE - PHQ9
1. LITTLE INTEREST OR PLEASURE IN DOING THINGS: NOT AT ALL
2. FEELING DOWN, DEPRESSED OR HOPELESS: NOT AT ALL
SUM OF ALL RESPONSES TO PHQ9 QUESTIONS 1 AND 2: 0

## 2024-09-19 NOTE — Clinical Note
Hi- Sent her to ER yesterday after viewing her labs (done in preparation for her CPE today) She had been offer all her meds for months How shall I manage this- just resume meds or start LA Insulin  short term

## 2024-09-19 NOTE — ASSESSMENT & PLAN NOTE
Emailed Endo for POC  Resume meds  May need LA insulin, will await Ebdo recs  Prescribed 8units to start for now  Pharmacy consult to help titrate

## 2024-09-19 NOTE — PROGRESS NOTES
ANNUAL CPE VISIT  Chief Complaint   Patient presents with    Annual Exam     HPI: was in ER yesterday after we saw labs done pre-visit for this appt  + hyperglycemia, and + ketonemia/ketonuria with elevated AG  In ER repeat ketones (-)  Was hydrated and given insulin then d/c;d home and todl to resume home meds  She has been off her meds for months    Has issues with her throat  Feels throat closes up on her before she drinks  Can eat ok but then goes to drink liquid cannot go down  AM mostly  Pills also difficult  Cannot move the food down at first but then does    Has yeast infection symptoms    Labs reviewed:  Component      Latest Ref Rng 9/18/2024   WBC      4.4 - 11.3 x10*3/uL 4.4    nRBC      0.0 - 0.0 /100 WBCs 0.0    RBC      4.00 - 5.20 x10*6/uL 4.91    HEMOGLOBIN      12.0 - 16.0 g/dL 14.4    HEMATOCRIT      36.0 - 46.0 % 42.3    MCV      80 - 100 fL 86    MCH      26.0 - 34.0 pg 29.3    MCHC      32.0 - 36.0 g/dL 34.0    RED CELL DISTRIBUTION WIDTH      11.5 - 14.5 % 12.6    Platelets      150 - 450 x10*3/uL 235    Neutrophils %      40.0 - 80.0 % 50.7    Immature Granulocytes %, Automated      0.0 - 0.9 % 0.2    Lymphocytes %      13.0 - 44.0 % 39.4    Monocytes %      2.0 - 10.0 % 7.2    Eosinophils %      0.0 - 6.0 % 1.8    Basophils %      0.0 - 2.0 % 0.7    Neutrophils Absolute      1.20 - 7.70 x10*3/uL 2.25    Immature Granulocytes Absolute, Automated      0.00 - 0.70 x10*3/uL 0.01    Lymphocytes Absolute      1.20 - 4.80 x10*3/uL 1.75    Monocytes Absolute      0.10 - 1.00 x10*3/uL 0.32    Eosinophils Absolute      0.00 - 0.70 x10*3/uL 0.08    Basophils Absolute      0.00 - 0.10 x10*3/uL 0.03    GLUCOSE      74 - 99 mg/dL 328 (H)    SODIUM      136 - 145 mmol/L 136    POTASSIUM      3.5 - 5.3 mmol/L 4.2    CHLORIDE      98 - 107 mmol/L 99    Bicarbonate      21 - 32 mmol/L 28    Anion Gap      10 - 20 mmol/L 13    Blood Urea Nitrogen      6 - 23 mg/dL 9    Creatinine      0.50 - 1.05 mg/dL 0.74     EGFR      >60 mL/min/1.73m*2 >90    Calcium      8.6 - 10.3 mg/dL 9.2    Albumin      3.4 - 5.0 g/dL 4.1    Alkaline Phosphatase      33 - 110 U/L 86    Total Protein      6.4 - 8.2 g/dL 7.0    AST      9 - 39 U/L 39    Bilirubin Total      0.0 - 1.2 mg/dL 0.9    ALT      7 - 45 U/L 58 (H)    Color, Urine      Light-Yellow, Yellow, Dark-Yellow  Light-Yellow    Appearance, Urine      Clear  Clear    Specific Gravity, Urine      1.005 - 1.035  1.027    pH, Urine      5.0, 5.5, 6.0, 6.5, 7.0, 7.5, 8.0  5.0    Protein, Urine      NEGATIVE, 10 (TRACE), 20 (TRACE) mg/dL NEGATIVE    Glucose, Urine      Normal mg/dL OVER (4+) !    Blood, Urine      NEGATIVE  NEGATIVE    Ketones, Urine      NEGATIVE mg/dL 10 (1+) !    Bilirubin, Urine      NEGATIVE  NEGATIVE    Urobilinogen, Urine      Normal mg/dL Normal    Nitrite, Urine      NEGATIVE  NEGATIVE    Leukocyte Esterase, Urine      NEGATIVE  25 Abbey/µL !    CHOLESTEROL      0 - 199 mg/dL 192    HDL CHOLESTEROL      mg/dL 32.0    Cholesterol/HDL Ratio 6.0    LDL Calculated --    VLDL --    TRIGLYCERIDES      0 - 149 mg/dL 451 (H)    Non HDL Cholesterol      0 - 149 mg/dL 160 (H)    WBC, Urine      1-5, NONE /HPF 1-5    RBC, Urine      NONE, 1-2, 3-5 /HPF 1-2    Squamous Epithelial Cells, Urine      Reference range not established. /HPF 1-9 (SPARSE)    Mucus, Urine      Reference range not established. /LPF FEW    Hyaline Casts, Urine      NONE /LPF OCCASIONAL !    Hemoglobin A1C      See comment % 14.7 (H)    Estimated Average Glucose      Not Established mg/dL 375    Vitamin D, 25-Hydroxy, Total      30 - 100 ng/mL 27 (L)    Thyroid Stimulating Hormone      0.44 - 3.98 mIU/L 5.80 (H)    Thyroxine, Free      0.61 - 1.12 ng/dL 0.96    Beta-Hydroxybutyrate      0.02 - 0.27 mmol/L 0.46 (H)    Osmolality, Serum      280 - 300 mOsm/kg 297           Assessment and Plan:  Problem List Items Addressed This Visit          High    Routine adult health maintenance - Primary    Overview      COVID 19 Pfizer vaccine 4/2/21, 4/30/21, 1/11/22  Influenza Vaccine, Split-Non Spec12/4/2003, 11/1/18  Noiexccgw59/3/2016   Shingrix 6/30/22, 9/20/22  Tdap (Age 7+)5/29/2009, 3/29/19  s/p XENIA, ovaries remain  Mamm 11/11/19; 11/13/20, 7/12/22  Cscope 6/20 (3yrs)  CT Calcium score 7/22: 0         Current Assessment & Plan     Annual Wellness exam completed   Preventive Health History reviewed  Ordered:   Labs    Mammogram   BMD   Cscope              Relevant Orders    Comprehensive Metabolic Panel    CBC and Auto Differential    Lipid Panel    Urinalysis with Reflex Microscopic       Medium    Cervical disc disease    Overview     XR 2019: There is disc space narrowing lower cervical spine at C5/C6 where there are anterior  endplate osteophyte formation   On Gabapentin         Relevant Medications    gabapentin (Neurontin) 100 mg capsule    Dysphagia    Overview     ? Etiology  ENT consult         Relevant Orders    Referral to ENT    Encounter for screening mammogram for breast cancer    Relevant Orders    BI mammo bilateral screening tomosynthesis    Fatty liver    Overview     US 2/16: Fatty infiltration of the liver, without focal hepatic lesion   On GLP1 med  FIB-4 Calculation: 1.3 at 9/18/2024  3:17 PM  FIB-4 Result Recommendations:  FIB-4 of 1.3 to 2.67 = intermediate risk of hepatic fibrosis; proceed with elastography (Fibroscan) imaging for confirmation and consider referral to hepatology             Relevant Orders    Liver Elastography (Fibroscan)    Hypertension, essential    Overview      Goal <130/80  On clonidine in past (for vasomotor sx also)  Lisinopril caused cough  On Benicar         Current Assessment & Plan     Likley uncontrolled as has been off medication  Resume and ensure < 130/80         Relevant Medications    olmesartan (BENIcar) 20 mg tablet    Hypothyroidism    Overview     On Synthroid  Goal Tsh 1-2         Current Assessment & Plan     Had been off med for months  Resume  "synthroid         Relevant Medications    levothyroxine (Synthroid, Levoxyl) 25 mcg tablet    Other Relevant Orders    TSH with reflex to Free T4 if abnormal    Screening for colon cancer    Relevant Orders    Colonoscopy Screening; Average Risk Patient    Type 2 diabetes mellitus without complication, without long-term current use of insulin (Multi)    Overview     on Trulicity and Januvia in past  On Mounjaro and metformin  On statin and ARB  (ACE caused cough)  Endo  manages         Current Assessment & Plan     Emailed Endo for POC  Resume meds  May need LA insulin, will await Ebdo recs  Prescribed 8units to start for now  Pharmacy consult to help titrate         Relevant Medications    rosuvastatin (Crestor) 5 mg tablet    tirzepatide (Mounjaro) 2.5 mg/0.5 mL pen injector (Start on 9/22/2024)    metFORMIN (Glucophage) 500 mg tablet    insulin glargine-yfgn (Semglee,insulin glargine-yfgn,) 100 unit/mL vial    Other Relevant Orders    Hemoglobin A1c    Albumin-Creatinine Ratio, Urine Random    Referral to Clinical Pharmacy    Hemoglobin A1c    Vitamin D deficiency    Overview     9/24: 27  goal 40-50;         Current Assessment & Plan     Resume 2K daily         Relevant Orders    Vitamin D 25-Hydroxy,Total (for eval of Vitamin D levels)     Other Visit Diagnoses       Candida vaginitis        Relevant Medications    fluconazole (Diflucan) 150 mg tablet    Inflammatory lesion of eyelid        may need removal  topical abx in interim    Relevant Medications    erythromycin (Romycin) 5 mg/gram (0.5 %) ophthalmic ointment    Other Relevant Orders    Referral to Ophthalmology    Medication management        Relevant Orders    Referral to Clinical Pharmacy          ROS otherwise negative aside from what was mentioned above in HPI.    Vitals  /74   Pulse 92   Temp 36.8 °C (98.3 °F)   Ht 1.651 m (5' 5\")   Wt 87.7 kg (193 lb 4 oz)   SpO2 97%   BMI 32.16 kg/m²   Body mass index is 32.16 kg/m².  Physical " Exam  Gen: Alert, NAD  HEENT:  Normal exam except has a small keratin filled lesion on her lower left eyelid  Neck:  No masses/nodes palpable; Thyroid nontender and without nodules; No KARYN  Respiratory:  Lungs CTAB  CV: RRR  Neuro:  Gross motor and sensory intact  Skin:  No suspicious lesions present  Breast: No masses or axillary lymphadenopathy  Gyn: Normal pelvic exam: no uterine masses or cervical lesions, or CMT; no vaginal D/C. No ovarian or adnexal masses; No external vaginal or anal/perineal lesions (Pt declined chaperone)    Allergies and Medications  Diphth,pertus(acell),tetanus; Flu vac 2022 65up-pbzfw80b(pf); Flu vac 2024 65up-[id number](pf); Glipizide; Lisinopril; Metformin; Pneumococcal 23-yvan ps vaccine; Tizanidine; and Venlafaxine  Current Outpatient Medications   Medication Instructions    cholecalciferol (Vitamin D-3) 50 mcg (2,000 unit) capsule 1 capsule, oral, Daily    erythromycin (Romycin) 5 mg/gram (0.5 %) ophthalmic ointment ophthalmic (eye), 4 times daily, Apply Amount per Dose: 0.5 inch (~1 cm) per dose.    fluconazole (Diflucan) 150 mg tablet Take one dose and repeat again in 3 days    gabapentin (NEURONTIN) 100 mg, oral, 3 times daily    insulin glargine-yfgn (SEMGLEE(INSULIN GLARGINE-YFGN)) 8 Units, subcutaneous, Every 24 hours, Take as directed per insulin instructions.    levothyroxine (SYNTHROID, LEVOXYL) 25 mcg, oral, Daily before breakfast    metFORMIN (GLUCOPHAGE) 500 mg, oral, 2 times daily    [START ON 9/22/2024] Mounjaro 2.5 mg, subcutaneous, Once Weekly    olmesartan (BENICAR) 20 mg, oral, Daily    rosuvastatin (CRESTOR) 5 mg, oral, Daily    vitamin E acid succinate (vitamin E succinate) 268 mg (400 unit) tablet 1-2 tablets, oral, Daily       Active Problem List  Patient Active Problem List   Diagnosis    Routine adult health maintenance    H/O thyroid nodule    BMI 32.0-32.9,adult    History of colon polyps    Diastolic dysfunction, left ventricle    Diverticulosis of colon     Fatty liver    Grade I hemorrhoids    Hypertension, essential    Hypothyroidism    Osteoarthritis of left knee    Leiomyoma    Migraine without status migrainosus, not intractable    Mixed hyperlipidemia    Rosacea    Type 2 diabetes mellitus without complication, without long-term current use of insulin (Multi)    Vitamin D deficiency    Screening for osteoporosis    Encounter for screening mammogram for breast cancer    Screening for colon cancer    Spondylosis of cervical region without myelopathy or radiculopathy    Cervical disc disease    Dysphagia    Cervical radiculopathy    Symptomatic menopausal or female climacteric states       Comprehensive Medical/Surgical/Social/Family History  Past Medical History:   Diagnosis Date    H/O abdominal ultrasound     12/16: Fatty infiltration of the liver, without focal hepatic lesion.    H/O cardiovascular stress test     Stress ECHO: 12/16: normal, stage 1 DD    H/O cervical spine x-ray     2019: There is disc space narrowing lower cervical spine at C5/C6 where there are anterior  endplate osteophyte formation    H/O chest x-ray 09/18/2024    normal    H/O CT scan     7/22: CT calcium score: 0 Short/small hiatal hernia including the gastroesophageal junction    H/O diagnostic ultrasound     US thyroid:6/20: IMPRESSION: Nonvisualization of the previously noted left thyroid lobe nodule, likely represented heterogeneity in the gland 12/19: An approximate 2.8 x 1.5 x 1.4 cm slightly heterogeneous overall hypoechoic nodule is present at the midpole. This nodule is mildly suspicious given these characteristics, and FNA, or relative short-term follow-up would be recommended    H/O magnetic resonance imaging of cervical spine     2016:  Disc herniations C3-4 and C6-7     Past Surgical History:   Procedure Laterality Date    BLADDER NECK SUSPENSION      Tkkktdwg-Dsaqpeico-Ggqkvy procedure    CHOLECYSTECTOMY  10/26/2018    Cholecystectomy    COLONOSCOPY  07/01/2020 6/20: -  Diverticulosis in the entire examined colon.                     - External hemorrhoids.                     - One 10 mm polyp at the appendiceal orifice, removed                     with a jumbo cold forceps. Resected and retrieved.    HYSTERECTOMY  10/26/2018    Hysterectomy, TAHBS and Bgnpvwrh-Vidhcpjtk-Tmbbvx procedure. Ovaries remain    OTHER SURGICAL HISTORY  08/28/2020    Lateral meniscus repair    OTHER SURGICAL HISTORY  06/24/2021    Left knee scope in August 2020, chondral defect, near full thickness, medial weightbearing surface of the medial femoral condyle.       Social History     Social History Narrative    Social History:    , 4 kids    Goes by [employer]    Works at [employer]    Nonsmoker    Rare ETOH    ---    Family History:    M: Uterine/cervical CA, IFG/DM, ?HTN    F: CAD (MI age 63), HTN, Hyperlipidemia, DM, Glaucoma, CHF    B: CAD age 40    B:    S:    Maunt: thyroid

## 2024-09-19 NOTE — TELEPHONE ENCOUNTER
Patient left VM asking if you had heard from Dr. Londono regarding the Mounjaro.  Patient does want to start it again.  I see it is sent to be filled on 9/22 but it needs to go to [facility] in [place].  Formatted

## 2024-09-19 NOTE — TELEPHONE ENCOUNTER
Left message for patient to inform her that a dosage change was made to her medication and to use 15 units. Also requested call back for initial appointment scheduling.    Kellen Del Real, PharmD  [phone]

## 2024-09-19 NOTE — ASSESSMENT & PLAN NOTE
Annual Wellness exam completed   Preventive Health History reviewed  Ordered:   Labs    Mammogram   BMD   Cscope

## 2024-09-21 ENCOUNTER — HOSPITAL ENCOUNTER (OUTPATIENT)
Dept: RADIOLOGY | Facility: CLINIC | Age: 59
Discharge: HOME | End: 2024-09-21
Payer: COMMERCIAL

## 2024-09-21 VITALS — WEIGHT: 193.52 LBS | BODY MASS INDEX: 32.24 KG/M2 | HEIGHT: 65 IN

## 2024-09-21 DIAGNOSIS — Z12.31 ENCOUNTER FOR SCREENING MAMMOGRAM FOR BREAST CANCER: ICD-10-CM

## 2024-09-21 PROCEDURE — 77063 BREAST TOMOSYNTHESIS BI: CPT | Performed by: RADIOLOGY

## 2024-09-21 PROCEDURE — 77067 SCR MAMMO BI INCL CAD: CPT | Performed by: RADIOLOGY

## 2024-09-21 PROCEDURE — 77067 SCR MAMMO BI INCL CAD: CPT

## 2024-09-25 NOTE — TELEPHONE ENCOUNTER
Patient left  stating that her blood sugars are better on the insulin but since yesterday her vision has been problematic.  She asked if it could be from the insulin.  Please advise

## 2024-09-25 NOTE — TELEPHONE ENCOUNTER
Pt states problematic vision vision is fuzzy when reading with her glasses on today is worst than yesterday  Her glucose readings are coming down most readings under 250   Asked if she has had a recent eye appt to have her eyes checked and suggested might want to get checked pt states probably just say its from her sugar  Please advise

## 2024-09-26 ENCOUNTER — TELEMEDICINE (OUTPATIENT)
Dept: PHARMACY | Facility: HOSPITAL | Age: 59
End: 2024-09-26
Payer: COMMERCIAL

## 2024-09-26 ENCOUNTER — TELEPHONE (OUTPATIENT)
Dept: PRIMARY CARE | Facility: CLINIC | Age: 59
End: 2024-09-26

## 2024-09-26 DIAGNOSIS — Z79.899 MEDICATION MANAGEMENT: ICD-10-CM

## 2024-09-26 DIAGNOSIS — E11.9 TYPE 2 DIABETES MELLITUS WITHOUT COMPLICATION, WITHOUT LONG-TERM CURRENT USE OF INSULIN (MULTI): ICD-10-CM

## 2024-09-26 RX ORDER — BLOOD SUGAR DIAGNOSTIC
STRIP MISCELLANEOUS
Qty: 100 EACH | Refills: 1 | Status: SHIPPED | OUTPATIENT
Start: 2024-09-26

## 2024-09-26 RX ORDER — TIRZEPATIDE 2.5 MG/.5ML
2.5 INJECTION, SOLUTION SUBCUTANEOUS
Qty: 2 ML | Refills: 0 | Status: SHIPPED | OUTPATIENT
Start: 2024-09-29

## 2024-09-26 RX ORDER — METFORMIN HYDROCHLORIDE 500 MG/1
500 TABLET, EXTENDED RELEASE ORAL
Qty: 180 TABLET | Refills: 1 | Status: SHIPPED | OUTPATIENT
Start: 2024-09-26

## 2024-09-26 NOTE — PROGRESS NOTES
Clinical Pharmacy Appointment    Patient ID: Sivakumar Combs is a 58 y.o. female who presents for Diabetes.    Pt is here for First appointment.     Referring Provider: Yoko Mitchell MD  PCP: Yoko Mitchell MD   Last visit with PCP: 9/19/2024   Next visit with PCP: 10/17/2025      Subjective     Interval History  Has seen improvement in blood glucose on insulin but recently has had blurry vision, was recommended to see ophthalmology  Has not been on Mounjaro 2.5 mg once weekly, believes Aimee did not have it  Has some discoloration around her throat that appears like she has sun, does not recall anything that would trigger that    HPI  DIABETES MELLITUS TYPE 2:    Diagnosed with diabetes. Known diabetic complications: possible retinopathy.  Does patient follow with Endocrinology: No- had previously followed in 2023     Current diabetic medications include:  Lantus 15 units daily  Metformin 500 mg twice daily  Mounjaro 2.5 mg once weekly    Clarifications to above regimen: has not yet started Mounjaro  Adverse Effects: difficulty with bowel control on metformin    Glucose Readings:  Glucometer/CGM Type: glucometer  Patient tests BG 1-3 times per day    Current home BG readings: this  mg/dL, mostly mid-200s mg/dL including many times after a meal  Yesterday: 190, 165, 260    Any episodes of hypoglycemia? No, no readings appearing low . Denies symptoms outside changes to vision.  Did patient treat episode of hypoglycemia appropriately? N/A  Does the patient have a prescription for ready-to-use Glucagon? No not currently having low glucose  Does pt have proteinuria? No    Lifestyle:  Diet: 3 meals/day. Trying to be mindful of food content. Admits does not get much protein.  BK: eggs and oats  DN: salads previously  Drinks: water or unsweetened tea throughout the day, sometimes coffee but not daily  Physical Activity: has been doing some exercise, trying to increase time per week    Secondary  Prevention:  Statin? Yes  ACE-I/ARB? Yes  Aspirin? No    Pertinent PMH Review:  PMH of Pancreatitis: No  PMH of Retinopathy: potentially, having current symptoms likely due to change in blood glucose  PMH of Urinary Tract Infections: recent yeast infection  PMH of MTC: No    Medication Reconciliation:  No reported changes    Drug Interactions  No relevant drug interactions were noted.    Medication System Management  Patient's preferred pharmacy: [facility]  Adherence/Organization: not yet started Mounjaro  Affordability/Accessibility: no current concerns, Mounjaro $20 on test billing      Objective   Allergies   Allergen Reactions    Diphth,Pertus(Acell),Tetanus Unknown    Flu Vac 2022 65up-Wfyou80b(Pf) Unknown    Flu Vac 2024 [id number](Pf) Other    Glipizide Unknown    Lisinopril Cough    Metformin Unknown    Pneumococcal 23-Jo Ps Vaccine Unknown    Tizanidine Unknown     Extremely dry mouth/difficulty swallowing    Venlafaxine Hallucinations     Flulike sx, felt drugged     Social History     Social History Narrative    Social History:    , 4 kids    Goes by [employer]    Works at [employer]    Nonsmoker    Rare ETOH    ---    Family History:    M: Uterine/cervical CA, IFG/DM, ?HTN    F: CAD (MI age 63), HTN, Hyperlipidemia, DM, Glaucoma, CHF    B: CAD age 40    B:    S:    Maunt: thyroid      Medication Review  Current Outpatient Medications   Medication Instructions    cholecalciferol (Vitamin D-3) 50 mcg (2,000 unit) capsule 1 capsule, oral, Daily    erythromycin (Romycin) 5 mg/gram (0.5 %) ophthalmic ointment ophthalmic (eye), 4 times daily, Apply Amount per Dose: 0.5 inch (~1 cm) per dose.    fluconazole (Diflucan) 150 mg tablet Take one dose and repeat again in 3 days    gabapentin (NEURONTIN) 100 mg, oral, 3 times daily    insulin glargine-yfgn (SEMGLEE(INSULIN GLARGINE-YFGN)) 15 Units, subcutaneous, Every 24 hours, Take as directed per insulin instructions.    levothyroxine (SYNTHROID,  LEVOXYL) 25 mcg, oral, Daily before breakfast    metFORMIN (GLUCOPHAGE) 500 mg, oral, 2 times daily    Mounjaro 2.5 mg, subcutaneous, Once Weekly    olmesartan (BENICAR) 20 mg, oral, Daily    rosuvastatin (CRESTOR) 5 mg, oral, Daily    vitamin E acid succinate (vitamin E succinate) 268 mg (400 unit) tablet 1-2 tablets, oral, Daily      Vitals  BP Readings from Last 2 Encounters:   09/19/24 120/74   09/18/24 154/70     BMI Readings from Last 1 Encounters:   09/21/24 32.20 kg/m²      Labs  A1C  Lab Results   Component Value Date    HGBA1C 14.7 (H) 09/18/2024    HGBA1C 8.7 03/22/2023    HGBA1C 7.2 (A) 10/07/2022     BMP  Lab Results   Component Value Date    CALCIUM 9.2 09/18/2024     (L) 09/18/2024    K 3.9 09/18/2024    CO2 27 09/18/2024    CL 97 (L) 09/18/2024    BUN 13 09/18/2024    CREATININE 0.85 09/18/2024    EGFR 81 09/18/2024     LFTs  Lab Results   Component Value Date    ALT 70 (H) 09/18/2024    AST 48 (H) 09/18/2024    ALKPHOS 85 09/18/2024    BILITOT 0.6 09/18/2024     FLP  Lab Results   Component Value Date    TRIG 451 (H) 09/18/2024    CHOL 192 09/18/2024    LDLF 53 06/21/2022    LDLCALC  09/18/2024      Comment:      The calculation of LDL and VLDL are inaccurate when the Triglycerides are greater than 400 mg/dL or when the patient is non-fasting. If LDL measurement is necessary contact the testing laboratory for an alternative LDL assay.                                  Near   Borderline      AGE      Desirable  Optimal    High     High     Very High     0-19 Y     0 - 109     ---    110-129   >/= 130     ----    20-24 Y     0 - 119     ---    120-159   >/= 160     ----      >24 Y     0 -  99   100-129  130-159   160-189     >/=190      HDL 32.0 09/18/2024     Urine Microalbumin  Lab Results   Component Value Date    MICROALBCREA 6.3 10/07/2022     Weight Management  Wt Readings from Last 3 Encounters:   09/21/24 87.8 kg (193 lb 8.3 oz)   09/19/24 87.7 kg (193 lb 4 oz)   09/18/24 90.7 kg (200 lb)       There is no height or weight on file to calculate BMI.     Assessment/Plan   Problem List Items Addressed This Visit       Type 2 diabetes mellitus without complication, without long-term current use of insulin (Multi)     Patient's goal A1c is < 7%.  Is pt at goal? No, last A1c 14.7% 9/18/24  Patient's SMBGs are improving, last few days fasting <200 mg/dL, but not yet at goal.     Rationale for plan: The patient reports eating near metformin dose but is still getting loose bowel movements. Given side effect on current lower dose, will switch to extended release product to attempt to minimize GI discomfort. The patient has started insulin but has not yet started Mounjaro. She requests it sent to [facility] as her normal pharmacy was out of the medication. She will plan to start Mounjaro upon arrival. Given not yet on Mounjaro and will want to wean insulin as Mounjaro is increased, will not increase insulin today. Will attempt to increase metformin XR and Mounjaro as able if tolerated.    Medication Changes:  CONTINUE:  Lantus 15 units daily  STOP  Metformin  mg twice daily  START  Metformin  mg twice daily  Mounjaro 2.5 mg once weekly    Future Considerations:  Plan to titrate up metformin and Mounjaro and titrate down insulin as tolerated.    Monitoring and Education:   Continue checking glucose 1-3 times daily  Resolution of diarrhea  Reviewed proper injection technique  Reviewed Mounjaro mechanism, side effects, and use.          Other Visit Diagnoses       Medication management                Clinical Pharmacist follow-up: 10/17 9:30AM, Telehealth visit    Continue all meds under the continuation of care with the referring provider and clinical pharmacy team.    Thank you,  Kellen Del Real, PharmD  Clinical Pharmacist  [phone]    Verbal consent to manage patient's drug therapy was obtained from the patient. They were informed they may decline to participate or withdraw from  participation in pharmacy services at any time.

## 2024-09-26 NOTE — ASSESSMENT & PLAN NOTE
Patient's goal A1c is < 7%.  Is pt at goal? No, last A1c 14.7% 9/18/24  Patient's SMBGs are improving, last few days fasting <200 mg/dL, but not yet at goal.     Rationale for plan: The patient reports eating near metformin dose but is still getting loose bowel movements. Given side effect on current lower dose, will switch to extended release product to attempt to minimize GI discomfort. The patient has started insulin but has not yet started Mounjaro. She requests it sent to [facility] as her normal pharmacy was out of the medication. She will plan to start Mounjaro upon arrival. Given not yet on Mounjaro and will want to wean insulin as Mounjaro is increased, will not increase insulin today. Will attempt to increase metformin XR and Mounjaro as able if tolerated.    Medication Changes:  CONTINUE:  Lantus 15 units daily  STOP  Metformin  mg twice daily  START  Metformin  mg twice daily  Mounjaro 2.5 mg once weekly    Future Considerations:  Plan to titrate up metformin and Mounjaro and titrate down insulin as tolerated.    Monitoring and Education:   Continue checking glucose 1-3 times daily  Resolution of diarrhea  Reviewed proper injection technique  Reviewed Mounjaro mechanism, side effects, and use.

## 2024-09-26 NOTE — TELEPHONE ENCOUNTER
Pharmacy left a VM stating that the patients insulin is in vials not pens so the order for the pen tips is incorrect and she needs syringes or change the semaglee to the pens.  Please call them to clarify

## 2024-09-30 ENCOUNTER — TELEPHONE (OUTPATIENT)
Dept: PRIMARY CARE | Facility: CLINIC | Age: 59
End: 2024-09-30
Payer: COMMERCIAL

## 2024-09-30 RX ORDER — NAPROXEN SODIUM 220 MG
TABLET ORAL
Qty: 100 EACH | Refills: 3 | Status: SHIPPED | OUTPATIENT
Start: 2024-09-30 | End: 2025-09-26

## 2024-09-30 NOTE — TELEPHONE ENCOUNTER
Spoke with patient.  Patient asked about syringes and I let her know they were called in today.  Patient stated her vision is still very fuzzy, but got stronger readers and it seems to help. Still has fuzzy vision constantly.  Relayed for patient to make appointment with ophthalmology as a diabetic screening because of the changes in her vision.  Patient verbally understood.

## 2024-09-30 NOTE — TELEPHONE ENCOUNTER
Spoke to pt relayed message  Diabetic retinopathy is a complication of diabetes that can lead to vision loss due to persistent high blood sugar.  Rec'd that she see an optometrist/ophthalmologist for a diabetic eye exam.

## 2024-10-02 ENCOUNTER — TELEPHONE (OUTPATIENT)
Dept: GASTROENTEROLOGY | Facility: CLINIC | Age: 59
End: 2024-10-02
Payer: COMMERCIAL

## 2024-10-07 ENCOUNTER — TELEPHONE (OUTPATIENT)
Dept: OPHTHALMOLOGY | Facility: CLINIC | Age: 59
End: 2024-10-07
Payer: COMMERCIAL

## 2024-10-07 ENCOUNTER — CLINICAL SUPPORT (OUTPATIENT)
Dept: GASTROENTEROLOGY | Facility: CLINIC | Age: 59
End: 2024-10-07
Payer: COMMERCIAL

## 2024-10-07 DIAGNOSIS — K76.0 FATTY LIVER: ICD-10-CM

## 2024-10-07 PROCEDURE — 91200 LIVER ELASTOGRAPHY: CPT | Performed by: INTERNAL MEDICINE

## 2024-10-07 NOTE — LETTER
October 9, 2024     Yoko Mitchell MD  [address] [facility], [address]  [place] [address]    Patient: Sivakumar Combs   YOB: 1965   Date of Visit: 10/7/2024       Dear Dr. Yoko Mitchell MD:    Thank you for referring Sivakumar Combs for evaluation with FibroScan. Below are my notes for this consultation.  If you have questions, please do not hesitate to call me.      Sincerely,     [facility] [id number] FIBROSCAN      CC: No Recipients  ______________________________________________________________________________________      Results:  E (median liver stiffness measurement):  7.1  kPa  CAP (controlled attenuation parameter):  227 dB/m    Interpretation:  This was a technically adequate study. 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 The CAP score is consistent with 0 - 5% hepatocyte steatosis (steatosis stage 0 of 3).    Bernardo Champion MD  Hepatology

## 2024-10-07 NOTE — TELEPHONE ENCOUNTER
Patient was referred to  [facility] for Glaucoma exam.     I called patient and left VM with callback information so patient can scheduled with Glaucoma provider.     -solomon

## 2024-10-08 ENCOUNTER — TELEPHONE (OUTPATIENT)
Dept: GASTROENTEROLOGY | Facility: CLINIC | Age: 59
End: 2024-10-08

## 2024-10-08 ENCOUNTER — TELEPHONE (OUTPATIENT)
Dept: GASTROENTEROLOGY | Facility: EXTERNAL LOCATION | Age: 59
End: 2024-10-08

## 2024-10-08 ENCOUNTER — OFFICE VISIT (OUTPATIENT)
Dept: OPHTHALMOLOGY | Facility: CLINIC | Age: 59
End: 2024-10-08
Payer: COMMERCIAL

## 2024-10-08 DIAGNOSIS — H00.15 CHALAZION OF LEFT LOWER EYELID: Primary | ICD-10-CM

## 2024-10-08 DIAGNOSIS — H02.889 MEIBOMIAN GLAND DISEASE, UNSPECIFIED LATERALITY: ICD-10-CM

## 2024-10-08 DIAGNOSIS — H01.9 INFLAMMATORY LESION OF EYELID: ICD-10-CM

## 2024-10-08 PROCEDURE — 99070(U24) BRUDER EYE MASK: Performed by: OPTOMETRIST

## 2024-10-08 PROCEDURE — TAXCU SALES TAX CUYAHOGA COUNTY: Performed by: OPTOMETRIST

## 2024-10-08 PROCEDURE — 92002 INTRM OPH EXAM NEW PATIENT: CPT | Performed by: OPTOMETRIST

## 2024-10-08 RX ORDER — NEOMYCIN SULFATE, POLYMYXIN B SULFATE AND DEXAMETHASONE 3.5; 10000; 1 MG/ML; [USP'U]/ML; MG/ML
1 SUSPENSION/ DROPS OPHTHALMIC 4 TIMES DAILY
Qty: 5 ML | Refills: 1 | Status: SHIPPED | OUTPATIENT
Start: 2024-10-08 | End: 2024-10-15

## 2024-10-08 RX ORDER — AZITHROMYCIN 250 MG/1
250 TABLET, FILM COATED ORAL DAILY
Qty: 6 TABLET | Refills: 0 | Status: SHIPPED | OUTPATIENT
Start: 2024-10-08 | End: 2024-10-13

## 2024-10-08 ASSESSMENT — VISUAL ACUITY
CORRECTION_TYPE: GLASSES
OD_CC: 20/30
OS_CC: 20/30
OS_CC+: -
METHOD: SNELLEN - LINEAR

## 2024-10-08 ASSESSMENT — ENCOUNTER SYMPTOMS
RESPIRATORY NEGATIVE: 0
ALLERGIC/IMMUNOLOGIC NEGATIVE: 0
NEUROLOGICAL NEGATIVE: 0
CONSTITUTIONAL NEGATIVE: 0
EYES NEGATIVE: 0
HEMATOLOGIC/LYMPHATIC NEGATIVE: 0
MUSCULOSKELETAL NEGATIVE: 0
PSYCHIATRIC NEGATIVE: 0
GASTROINTESTINAL NEGATIVE: 0
CARDIOVASCULAR NEGATIVE: 1
ENDOCRINE NEGATIVE: 0

## 2024-10-08 ASSESSMENT — SLIT LAMP EXAM - LIDS: COMMENTS: 2+ BLEPHARITIS

## 2024-10-08 ASSESSMENT — EXTERNAL EXAM - LEFT EYE: OS_EXAM: NORMAL

## 2024-10-08 ASSESSMENT — EXTERNAL EXAM - RIGHT EYE: OD_EXAM: NORMAL

## 2024-10-08 NOTE — PROGRESS NOTES
Chalazion LLL that expresses liquid contents/pus. About 4 gland affected.    Blepharitis: Meibomain gland disease (Effron scale 0-4, 0:no abnormality, 4: thick creamy yellow expression at all gland orifices, expression continuous, conjunctival redness). Collarettes (0-4, 0: 0-2 lashes with collarettes, 1: 3-10, 2: >10 but <1/3rd of lashes, 3: >1/3rd to <2/3rd of lashes, 4: >2/3rd of lashes.  Right eye (OD): stage 2 collarettes: stage 0  Left eye (OS): stage 2 collarettes: stage 0     Start brandi polydex gtt QID x 1 week then BID x 1 week then every day x 1 week then DC.  Start azithromycin 500 mg day 1 then 4 days 250 mg  WC Brudermask BID 5-10 minutes.  Clean with Sterilid or other hypochlorous acid product every day let soak into eyelid.     RTC 1 month for eyelid evaluation. Dry eye testing as well.

## 2024-10-08 NOTE — TELEPHONE ENCOUNTER
----- Message from Milena BORDEN sent at 10/2/2024 10:45 AM EDT -----  Call patient to schedule colon 2025 with Dr. Romano.

## 2024-10-09 NOTE — PROGRESS NOTES
Results:  E (median liver stiffness measurement):  7.1  kPa  CAP (controlled attenuation parameter):  227 dB/m    Interpretation:  This was a technically adequate study. 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 The CAP score is consistent with 0 - 5% hepatocyte steatosis (steatosis stage 0 of 3).    Bernardo Champion MD  Hepatology

## 2024-10-10 DIAGNOSIS — Z12.11 COLON CANCER SCREENING: ICD-10-CM

## 2024-10-14 RX ORDER — SODIUM, POTASSIUM,MAG SULFATES 17.5-3.13G
1 SOLUTION, RECONSTITUTED, ORAL ORAL 2 TIMES DAILY
Qty: 1 EACH | Refills: 0 | Status: SHIPPED | OUTPATIENT
Start: 2025-01-06 | End: 2025-01-07

## 2024-10-17 ENCOUNTER — APPOINTMENT (OUTPATIENT)
Dept: PHARMACY | Facility: HOSPITAL | Age: 59
End: 2024-10-17
Payer: COMMERCIAL

## 2024-10-17 DIAGNOSIS — E11.9 TYPE 2 DIABETES MELLITUS WITHOUT COMPLICATION, WITHOUT LONG-TERM CURRENT USE OF INSULIN (MULTI): ICD-10-CM

## 2024-10-17 DIAGNOSIS — Z79.899 MEDICATION MANAGEMENT: ICD-10-CM

## 2024-10-17 RX ORDER — INSULIN GLARGINE-YFGN 100 [IU]/ML
10 INJECTION, SOLUTION SUBCUTANEOUS EVERY 24 HOURS
Start: 2024-10-17 | End: 2025-10-17

## 2024-10-17 RX ORDER — METFORMIN HYDROCHLORIDE 500 MG/1
500 TABLET, EXTENDED RELEASE ORAL
Start: 2024-10-17

## 2024-10-17 RX ORDER — TIRZEPATIDE 5 MG/.5ML
5 INJECTION, SOLUTION SUBCUTANEOUS WEEKLY
Qty: 2 ML | Refills: 0 | Status: SHIPPED | OUTPATIENT
Start: 2024-10-17

## 2024-10-17 NOTE — PROGRESS NOTES
Clinical Pharmacy Appointment    Patient ID: Sivakumar Combs is a 58 y.o. female who presents for Diabetes.    Pt is here for Follow Up appointment.     Referring Provider: Yoko Mitchell MD  PCP: Yoko Mitchell MD   Last visit with PCP: 9/19/2024   Next visit with PCP: 10/17/2025      Subjective     Interval History  Has noted slow improvements to her vision, ophthalmologist believes she will eventually see full improvement  Has taken two doses of Mounjaro so far, third tomorrow- no GI upset after Mounjaro    HPI  DIABETES MELLITUS TYPE 2:    Diagnosed with diabetes. Known diabetic complications: possible retinopathy.  Does patient follow with Endocrinology: No- had previously followed in 2023     Current diabetic medications include:  Semglee 15 units daily  Metformin 500 mg twice daily  Mounjaro 2.5 mg once weekly    Clarifications to above regimen: has not yet started Mounjaro  Adverse Effects: still has GI upset to metformin- stopped about 4 days ago due to frequent bowel movement    Glucose Readings:  Glucometer/CGM Type: glucometer  Patient tests BG 1-3 times per day    Current home BG readings: 2x 120s, 2x 130s, 2x 140s fasting in the past week  Previous: this  mg/dL, mostly mid-200s mg/dL including many times after a meal  Yesterday: 190, 165, 260    Any episodes of hypoglycemia? No, no readings appearing low . Denies symptoms outside changes to vision.  Did patient treat episode of hypoglycemia appropriately? N/A  Does the patient have a prescription for ready-to-use Glucagon? No not currently having low glucose  Does pt have proteinuria? No    Lifestyle:  Has been trying to increase exercise and be mindful about it, recently working towards deadline and needing to stay at work longer hours    Hx:  Diet: 3 meals/day. Trying to be mindful of food content. Admits does not get much protein.  BK: eggs and oats  DN: salads previously  Drinks: water or unsweetened tea throughout the day, sometimes  "coffee but not daily  Physical Activity: has been doing some exercise, trying to increase time per week    Secondary Prevention:  Statin? Yes  ACE-I/ARB? Yes  Aspirin? No    Pertinent PMH Review:  PMH of Pancreatitis: No  PMH of Retinopathy: ophthalmologist does not note retinopathy  PMH of Urinary Tract Infections: recent yeast infection  PMH of MTC: No    Medication Reconciliation:  No reported changes    Drug Interactions  No relevant drug interactions were noted.    Medication System Management  Patient's preferred pharmacy: [facility]  Adherence/Organization: appropriate  Affordability/Accessibility: no current concerns, Mounjaro $20 on test billing      Objective   Allergies   Allergen Reactions    Diphth,Pertus(Acell),Tetanus Unknown    Flu Vac 2022 65up-Fyitl92b(Pf) Unknown    Flu Vac 2024 65up-Nxtrx43x(Pf) Other    Glipizide Unknown    Lisinopril Cough    Metformin Unknown    Pneumococcal 23-Jo Ps Vaccine Unknown    Tizanidine Unknown     Extremely dry mouth/difficulty swallowing    Venlafaxine Hallucinations     Flulike sx, felt drugged     Social History     Social History Narrative    Social History:    , 4 kids    Goes by [employer]    Works at [employer]    Nonsmoker    Rare ETOH    ---    Family History:    M: Uterine/cervical CA, IFG/DM, ?HTN    F: CAD (MI age 63), HTN, Hyperlipidemia, DM, Glaucoma, CHF    B: CAD age 40    B:    S:    Maunt: thyroid      Medication Review  Current Outpatient Medications   Medication Instructions    cholecalciferol (Vitamin D-3) 50 mcg (2,000 unit) capsule 1 capsule, oral, Daily    gabapentin (NEURONTIN) 100 mg, oral, 3 times daily    insulin glargine-yfgn (SEMGLEE(INSULIN GLARGINE-YFGN)) 15 Units, subcutaneous, Every 24 hours, Take as directed per insulin instructions.    insulin syringe-needle U-100 31G X 5/16\" 0.5 mL syringe Use to inject insulin 1 time daily.    levothyroxine (SYNTHROID, LEVOXYL) 25 mcg, oral, Daily before breakfast    metFORMIN XR " "(GLUCOPHAGE-XR) 500 mg, oral, 2 times daily (morning and late afternoon), Do not crush, chew, or split.    Mounjaro 2.5 mg, subcutaneous, Once Weekly    olmesartan (BENICAR) 20 mg, oral, Daily    pen needle, diabetic (BD Ultra-Fine Micro Pen Needle) 32 gauge x 1/4\" needle Use to inject insulin once daily.    rosuvastatin (CRESTOR) 5 mg, oral, Daily    [START ON 1/6/2025] sodium,potassium,mag sulfates (Suprep Bowel Prep Kit) 17.5-3.13-1.6 gram recon soln solution 1 bottle, oral, 2 times daily, Follow instructions given    vitamin E acid succinate (vitamin E succinate) 268 mg (400 unit) tablet 1-2 tablets, oral, Daily      Vitals  BP Readings from Last 2 Encounters:   09/19/24 120/74   09/18/24 154/70     BMI Readings from Last 1 Encounters:   09/21/24 32.20 kg/m²      Labs  A1C  Lab Results   Component Value Date    HGBA1C 14.7 (H) 09/18/2024    HGBA1C 8.7 03/22/2023    HGBA1C 7.2 (A) 10/07/2022     BMP  Lab Results   Component Value Date    CALCIUM 9.2 09/18/2024     (L) 09/18/2024    K 3.9 09/18/2024    CO2 27 09/18/2024    CL 97 (L) 09/18/2024    BUN 13 09/18/2024    CREATININE 0.85 09/18/2024    EGFR 81 09/18/2024     LFTs  Lab Results   Component Value Date    ALT 70 (H) 09/18/2024    AST 48 (H) 09/18/2024    ALKPHOS 85 09/18/2024    BILITOT 0.6 09/18/2024     FLP  Lab Results   Component Value Date    TRIG 451 (H) 09/18/2024    CHOL 192 09/18/2024    LDLF 53 06/21/2022    LDLCALC  09/18/2024      Comment:      The calculation of LDL and VLDL are inaccurate when the Triglycerides are greater than 400 mg/dL or when the patient is non-fasting. If LDL measurement is necessary contact the testing laboratory for an alternative LDL assay.                                  Near   Borderline      AGE      Desirable  Optimal    High     High     Very High     0-19 Y     0 - 109     ---    110-129   >/= 130     ----    20-24 Y     0 - 119     ---    120-159   >/= 160     ----      >24 Y     0 -  99   100-129  130-159 "   160-189     >/=190      HDL 32.0 09/18/2024     Urine Microalbumin  Lab Results   Component Value Date    MICROALBCREA 6.3 10/07/2022     Weight Management  Wt Readings from Last 3 Encounters:   09/21/24 87.8 kg (193 lb 8.3 oz)   09/19/24 87.7 kg (193 lb 4 oz)   09/18/24 90.7 kg (200 lb)      There is no height or weight on file to calculate BMI.     Assessment/Plan   Problem List Items Addressed This Visit       Type 2 diabetes mellitus without complication, without long-term current use of insulin (Multi)     Patient's goal A1c is < 7%.  Is pt at goal? No, 14.7 as of 9/18/24  Patient's SMBGs are near or at goal with fasting levels 120s-140s.     Rationale for plan: The patient is showing significant improvement in blood glucose levels with current therapy. Her vision is slowly improving and ophthalmologist does not suspect permanent diabetic retinopathy, the patient reports being told that it may take weeks to months but improvement to her vision is expected to continue. She is tolerating Mounjaro well but continues to report diarrhea to metformin, despite changing to XR formulation. She has not taken in the last 4 days due to limiting her activities throughout the day. She is willing to try taking it once daily in the evening. Given tolerating Mounjaro and seeing significant improvement to blood glucose, the patient is willing to increase after completing the 2.5 mg weekly dose. She will be due for 5 mg dose starting 11/1/2024. She was told that she may decrease insulin to 10 units at that time (she does not believe she can accurately measure 12 units on her current insulin syringe given her vision concerns).    Medication Changes:  CONTINUE:  Mounjaro 2.5 mg weekly for two more doses  Semglee 15 units for two weeks  INCREASE  Mounjaro to 5 mg weekly starting 11/1/2024  DECREASE  Metformin to 500 mg XR daily with lunch or dinner  Semglee to 10 units starting 11/1/2024    Future Considerations:  Plan to wean  insulin as tolerated while increasing Mounjaro    Monitoring and Education:   Patient is taking metformin with meals and still having side effects. Plans to start with dinner only. Recommended if still having issues to try with biggest meal of day which is lunch.  Semglee is good for 28 days once vial is opened.          Other Visit Diagnoses       Medication management                  Clinical Pharmacist follow-up: 11/14 10AM, Telehealth visit    Continue all meds under the continuation of care with the referring provider and clinical pharmacy team.    Thank you,  Kellen Del Real, PharmD  Clinical Pharmacist  [phone]    Verbal consent to manage patient's drug therapy was obtained from the patient. They were informed they may decline to participate or withdraw from participation in pharmacy services at any time.

## 2024-10-17 NOTE — ASSESSMENT & PLAN NOTE
Patient's goal A1c is < 7%.  Is pt at goal? No, 14.7 as of 9/18/24  Patient's SMBGs are near or at goal with fasting levels 120s-140s.     Rationale for plan: The patient is showing significant improvement in blood glucose levels with current therapy. Her vision is slowly improving and ophthalmologist does not suspect permanent diabetic retinopathy, the patient reports being told that it may take weeks to months but improvement to her vision is expected to continue. She is tolerating Mounjaro well but continues to report diarrhea to metformin, despite changing to XR formulation. She has not taken in the last 4 days due to limiting her activities throughout the day. She is willing to try taking it once daily in the evening. Given tolerating Mounjaro and seeing significant improvement to blood glucose, the patient is willing to increase after completing the 2.5 mg weekly dose. She will be due for 5 mg dose starting 11/1/2024. She was told that she may decrease insulin to 10 units at that time (she does not believe she can accurately measure 12 units on her current insulin syringe given her vision concerns).    Medication Changes:  CONTINUE:  Mounjaro 2.5 mg weekly for two more doses  Semglee 15 units for two weeks  INCREASE  Mounjaro to 5 mg weekly starting 11/1/2024  DECREASE  Metformin to 500 mg XR daily with lunch or dinner  Semglee to 10 units starting 11/1/2024    Future Considerations:  Plan to wean insulin as tolerated while increasing Mounjaro    Monitoring and Education:   Patient is taking metformin with meals and still having side effects. Plans to start with dinner only. Recommended if still having issues to try with biggest meal of day which is lunch.  Semglee is good for 28 days once vial is opened.

## 2024-10-23 ENCOUNTER — APPOINTMENT (OUTPATIENT)
Dept: PHARMACY | Facility: HOSPITAL | Age: 59
End: 2024-10-23
Payer: COMMERCIAL

## 2024-10-28 DIAGNOSIS — E11.9 TYPE 2 DIABETES MELLITUS WITHOUT COMPLICATION, WITHOUT LONG-TERM CURRENT USE OF INSULIN (MULTI): ICD-10-CM

## 2024-10-28 RX ORDER — INSULIN GLARGINE-YFGN 100 [IU]/ML
10 INJECTION, SOLUTION SUBCUTANEOUS EVERY 24 HOURS
Qty: 10 ML | Refills: 2 | Status: SHIPPED | OUTPATIENT
Start: 2024-10-28

## 2024-11-14 ENCOUNTER — APPOINTMENT (OUTPATIENT)
Dept: PHARMACY | Facility: HOSPITAL | Age: 59
End: 2024-11-14
Payer: COMMERCIAL

## 2024-11-14 DIAGNOSIS — E11.9 TYPE 2 DIABETES MELLITUS WITHOUT COMPLICATION, WITHOUT LONG-TERM CURRENT USE OF INSULIN (MULTI): ICD-10-CM

## 2024-11-14 DIAGNOSIS — Z79.899 MEDICATION MANAGEMENT: ICD-10-CM

## 2024-11-14 RX ORDER — INSULIN GLARGINE-YFGN 100 [IU]/ML
5 INJECTION, SOLUTION SUBCUTANEOUS EVERY 24 HOURS
Qty: 10 ML | Refills: 0 | Status: SHIPPED | OUTPATIENT
Start: 2024-11-14

## 2024-11-14 RX ORDER — INSULIN GLARGINE-YFGN 100 [IU]/ML
5 INJECTION, SOLUTION SUBCUTANEOUS EVERY 24 HOURS
Qty: 10 ML | Refills: 2 | Status: SHIPPED | OUTPATIENT
Start: 2024-11-14 | End: 2024-11-14 | Stop reason: SDUPTHER

## 2024-11-14 RX ORDER — TIRZEPATIDE 7.5 MG/.5ML
7.5 INJECTION, SOLUTION SUBCUTANEOUS WEEKLY
Qty: 2 ML | Refills: 1 | Status: SHIPPED | OUTPATIENT
Start: 2024-11-14

## 2024-11-14 NOTE — PROGRESS NOTES
Clinical Pharmacy Appointment    Patient ID: Sivakumar Combs is a 59 y.o. female who presents for Diabetes.    Pt is here for Follow Up appointment.     Referring Provider: Yoko Mitchell MD  PCP: Yoko Mitchell MD   Last visit with PCP: 9/19/2024   Next visit with PCP: 10/17/2025      Subjective     Interval History  Confirms starting Mounjaro on 11/1  Has A1c ordered for December- please draw before next visit  Reports sporadic use of metformin once daily in the evening- avoids if she has a lot of meetings    HPI  DIABETES MELLITUS TYPE 2:    Diagnosed with diabetes. Known diabetic complications: possible retinopathy.  Does patient follow with Endocrinology: No- had previously followed in 2023; scheduled again in 1/2025     Current diabetic medications include:  Semglee 10 units daily  Metformin  mg daily  Mounjaro 5 mg once weekly    Clarifications to above regimen: inconsistent with metformin from day-to-day  Adverse Effects: GI upset without food, better with food but not great; no concerns with other meds; did note that vision improved when out of insulin for a few days in beginning of month    Glucose Readings:  Glucometer/CGM Type: glucometer  Patient tests BG 1-3 times per day    Current home BG readings: fasting average in past 4 weeks 143, 134, 135, 118; highest number 160 mg/dL 4 weeks ago, in past week 143 highest, 89 lowest  Previous: 2x 120s, 2x 130s, 2x 140s fasting in the past week    Any episodes of hypoglycemia? No, no readings appearing low or symptoms . Denies symptoms outside changes to vision.  Did patient treat episode of hypoglycemia appropriately? N/A  Does the patient have a prescription for ready-to-use Glucagon? No not currently having low glucose  Does pt have proteinuria? No    Lifestyle:  Does feel appetite reduction on the Mounjaro    10/17:  Has been trying to increase exercise and be mindful about it, recently working towards deadline and needing to stay at work longer  hours    Hx:  Diet: 3 meals/day. Trying to be mindful of food content. Admits does not get much protein.  BK: eggs and oats  DN: salads previously  Drinks: water or unsweetened tea throughout the day, sometimes coffee but not daily  Physical Activity: has been doing some exercise, trying to increase time per week    Secondary Prevention:  Statin? Yes  ACE-I/ARB? Yes  Aspirin? No    Pertinent PMH Review:  PMH of Pancreatitis: No  PMH of Retinopathy: ophthalmologist does not note retinopathy  PMH of Urinary Tract Infections: recent yeast infection  PMH of MTC: No    Medication Reconciliation:  No reported changes    Drug Interactions  No relevant drug interactions were noted.    Medication System Management  Patient's preferred pharmacy: [facility]  Adherence/Organization: appropriate  Affordability/Accessibility: no current concerns, Mounjaro $20 on test billing      Objective   Allergies   Allergen Reactions    Diphth,Pertus(Acell),Tetanus Unknown    Flu Vac 2022 65up-Mvnhk55h(Pf) Unknown    Flu Vac 2024 65up-Phmee37u(Pf) Other    Glipizide Unknown    Lisinopril Cough    Metformin Unknown    Pneumococcal 23-Jo Ps Vaccine Unknown    Tizanidine Unknown     Extremely dry mouth/difficulty swallowing    Venlafaxine Hallucinations     Flulike sx, felt drugged     Social History     Social History Narrative    Social History:    , 4 kids    Goes by [employer]    Works at [employer]    Nonsmoker    Rare ETOH    ---    Family History:    M: Uterine/cervical CA, IFG/DM, ?HTN    F: CAD (MI age 63), HTN, Hyperlipidemia, DM, Glaucoma, CHF    B: CAD age 40    B:    S:    Maunt: thyroid      Medication Review  Current Outpatient Medications   Medication Instructions    cholecalciferol (Vitamin D-3) 50 mcg (2,000 unit) capsule 1 capsule, oral, Daily    gabapentin (NEURONTIN) 100 mg, oral, 3 times daily    insulin glargine-yfgn (SEMGLEE(INSULIN GLARGINE-YFGN)) 5 Units, subcutaneous, Every 24 hours, Take as directed  "per insulin instructions. Vial expires after 4 weeks.    insulin syringe-needle U-100 31G X 5/16\" 0.5 mL syringe Use to inject insulin 1 time daily.    levothyroxine (SYNTHROID, LEVOXYL) 25 mcg, oral, Daily before breakfast    Mounjaro 7.5 mg, subcutaneous, Weekly    olmesartan (BENICAR) 20 mg, oral, Daily    pen needle, diabetic (BD Ultra-Fine Micro Pen Needle) 32 gauge x 1/4\" needle Use to inject insulin once daily.    rosuvastatin (CRESTOR) 5 mg, oral, Daily    [START ON 1/6/2025] sodium,potassium,mag sulfates (Suprep Bowel Prep Kit) 17.5-3.13-1.6 gram recon soln solution 1 bottle, oral, 2 times daily, Follow instructions given    vitamin E acid succinate (vitamin E succinate) 268 mg (400 unit) tablet 1-2 tablets, oral, Daily      Vitals  BP Readings from Last 2 Encounters:   09/19/24 120/74   09/18/24 154/70     BMI Readings from Last 1 Encounters:   09/21/24 32.20 kg/m²      Labs  A1C  Lab Results   Component Value Date    HGBA1C 14.7 (H) 09/18/2024    HGBA1C 8.7 03/22/2023    HGBA1C 7.2 (A) 10/07/2022     BMP  Lab Results   Component Value Date    CALCIUM 9.2 09/18/2024     (L) 09/18/2024    K 3.9 09/18/2024    CO2 27 09/18/2024    CL 97 (L) 09/18/2024    BUN 13 09/18/2024    CREATININE 0.85 09/18/2024    EGFR 81 09/18/2024     LFTs  Lab Results   Component Value Date    ALT 70 (H) 09/18/2024    AST 48 (H) 09/18/2024    ALKPHOS 85 09/18/2024    BILITOT 0.6 09/18/2024     FLP  Lab Results   Component Value Date    TRIG 451 (H) 09/18/2024    CHOL 192 09/18/2024    LDLF 53 06/21/2022    LDLCALC  09/18/2024      Comment:      The calculation of LDL and VLDL are inaccurate when the Triglycerides are greater than 400 mg/dL or when the patient is non-fasting. If LDL measurement is necessary contact the testing laboratory for an alternative LDL assay.                                  Near   Borderline      AGE      Desirable  Optimal    High     High     Very High     0-19 Y     0 - 109     ---    110-129   >/= " 130     ----    20-24 Y     0 - 119     ---    120-159   >/= 160     ----      >24 Y     0 -  99   100-129  130-159   160-189     >/=190      HDL 32.0 09/18/2024     Urine Microalbumin  Lab Results   Component Value Date    MICROALBCREA 6.3 10/07/2022     Weight Management  Wt Readings from Last 3 Encounters:   09/21/24 87.8 kg (193 lb 8.3 oz)   09/19/24 87.7 kg (193 lb 4 oz)   09/18/24 90.7 kg (200 lb)      There is no height or weight on file to calculate BMI.     Assessment/Plan   Problem List Items Addressed This Visit       Type 2 diabetes mellitus without complication, without long-term current use of insulin (Multi)     Patient's goal A1c is < 7%.  Is pt at goal? No, 14.7% in September. Plans to get repeat 12/18.  Patient's SMBGs are improving, with last week average controlled at 118.     Rationale for plan: The patient is still having issues tolerating metformin with frequent bowel movements even on lowest dose extended release with meals. Given this, will stop metformin at this time- patient already taking sporadically. The patient has seen significant improvement to blood glucose on Mounjaro and continues to tolerate this well. She does wish to work toward the goal of increasing Mounjaro and stopping insulin. Given she has been controlled in the past week, will decrease insulin to 5 units when Mounjaro is increased to 7.5 mg in 2 weeks. The patient is to get an A1c the day before next follow-up appointment to check improvement- based on reported readings, expect A1c to be significantly improved <10%.    Medication Changes:  CONTINUE:  Mounjaro 5 mg weekly for 2 weeks  Semglee 10 units nightly for 2 weeks  STOP  Metformin  mg  INCREASE  Mounjaro to 7.5 mg weekly on 11/29  DECREASE  Semglee to 5 units nightly on 11/29    Future Considerations:  Plan to wean off insulin as able    Monitoring and Education:   Continue monitoring glucose at least daily and if symptoms of low blood sugar occur          Relevant Medications    tirzepatide (Mounjaro) 7.5 mg/0.5 mL pen injector    insulin glargine-yfgn (Semglee,insulin glargine-yfgn,) 100 unit/mL vial    Other Relevant Orders    Referral to Clinical Pharmacy     Other Visit Diagnoses       Medication management        Relevant Orders    Referral to Clinical Pharmacy                Clinical Pharmacist follow-up: 12/19 10AM, Telehealth visit    Continue all meds under the continuation of care with the referring provider and clinical pharmacy team.    Thank you,  Kellen Del Real, PharmD  Clinical Pharmacist  [phone]    Verbal consent to manage patient's drug therapy was obtained from the patient. They were informed they may decline to participate or withdraw from participation in pharmacy services at any time.

## 2024-11-14 NOTE — ASSESSMENT & PLAN NOTE
Patient's goal A1c is < 7%.  Is pt at goal? No, 14.7% in September. Plans to get repeat 12/18.  Patient's SMBGs are improving, with last week average controlled at 118.     Rationale for plan: The patient is still having issues tolerating metformin with frequent bowel movements even on lowest dose extended release with meals. Given this, will stop metformin at this time- patient already taking sporadically. The patient has seen significant improvement to blood glucose on Mounjaro and continues to tolerate this well. She does wish to work toward the goal of increasing Mounjaro and stopping insulin. Given she has been controlled in the past week, will decrease insulin to 5 units when Mounjaro is increased to 7.5 mg in 2 weeks. The patient is to get an A1c the day before next follow-up appointment to check improvement- based on reported readings, expect A1c to be significantly improved <10%.    Medication Changes:  CONTINUE:  Mounjaro 5 mg weekly for 2 weeks  Semglee 10 units nightly for 2 weeks  STOP  Metformin  mg  INCREASE  Mounjaro to 7.5 mg weekly on 11/29  DECREASE  Semglee to 5 units nightly on 11/29    Future Considerations:  Plan to wean off insulin as able    Monitoring and Education:   Continue monitoring glucose at least daily and if symptoms of low blood sugar occur

## 2024-11-19 ENCOUNTER — APPOINTMENT (OUTPATIENT)
Dept: OPHTHALMOLOGY | Facility: CLINIC | Age: 59
End: 2024-11-19
Payer: COMMERCIAL

## 2024-12-05 ENCOUNTER — TELEPHONE (OUTPATIENT)
Dept: OPHTHALMOLOGY | Facility: CLINIC | Age: 59
End: 2024-12-05
Payer: COMMERCIAL

## 2024-12-05 NOTE — TELEPHONE ENCOUNTER
Patient was scheduled with Jasen on 1/3 she is a NPV Pt. Due to Jasen only seeing his Established patient she needs to be rescheduled with [address] [address] Ahmed, or Tash.      I called pt and left her a VM with my callback information so patient can get scheduled correctly.      -Mallory

## 2024-12-17 ENCOUNTER — APPOINTMENT (OUTPATIENT)
Dept: PRIMARY CARE | Facility: CLINIC | Age: 59
End: 2024-12-17
Payer: COMMERCIAL

## 2024-12-18 ENCOUNTER — LAB (OUTPATIENT)
Dept: LAB | Facility: LAB | Age: 59
End: 2024-12-18
Payer: COMMERCIAL

## 2024-12-18 DIAGNOSIS — E11.9 TYPE 2 DIABETES MELLITUS WITHOUT COMPLICATION, WITHOUT LONG-TERM CURRENT USE OF INSULIN (MULTI): ICD-10-CM

## 2024-12-18 LAB
EST. AVERAGE GLUCOSE BLD GHB EST-MCNC: 157 MG/DL
HBA1C MFR BLD: 7.1 %

## 2024-12-18 PROCEDURE — 83036 HEMOGLOBIN GLYCOSYLATED A1C: CPT

## 2024-12-18 PROCEDURE — 36415 COLL VENOUS BLD VENIPUNCTURE: CPT

## 2024-12-19 ENCOUNTER — APPOINTMENT (OUTPATIENT)
Dept: PHARMACY | Facility: HOSPITAL | Age: 59
End: 2024-12-19
Payer: COMMERCIAL

## 2024-12-19 DIAGNOSIS — Z79.899 MEDICATION MANAGEMENT: ICD-10-CM

## 2024-12-19 DIAGNOSIS — E11.9 TYPE 2 DIABETES MELLITUS WITHOUT COMPLICATION, WITHOUT LONG-TERM CURRENT USE OF INSULIN (MULTI): ICD-10-CM

## 2024-12-19 NOTE — ASSESSMENT & PLAN NOTE
Patient's goal A1c is < 7%.  Is pt at goal? No, however greatly improved from over 14% to 7.1% yesterday. Next A1c likely controlled based on current readings.  Patient's SMBGs are controlled between 80s-130s, with very few above 130.     Rationale for plan: The patient self-discontinued insulin based on improvements in her glucose. She is now only taking Mounjaro 7.5 mg weekly. This has continued to provide controlled readings, this morning at 84 mg/dL. She does admit to significant nausea from first doses of Mounjaro 7.5 mg weekly. She is willing to continue this dose but does not wish to advance at this time. She prefers continuing this dose rather than decreasing to Mounjaro 5 mg. Given she has not reported vomiting and that her second dose was better tolerated than her first dose, and her blood glucose readings are currently controlled, agreeable to continuing Mounjaro 7.5 mg at this time. Will reassess need to continue Mounjaro 7.5 mg versus side effects again in January. Low threshold to decrease dose given well-controlled glucose readings and current side effects.    Medication Changes:  CONTINUE  Mounjaro 7.5 mg weekly    Monitoring and Education:   Continue monitoring glucose at least daily  Monitor for improvement or worsening of GI side effects

## 2024-12-19 NOTE — PROGRESS NOTES
Clinical Pharmacy Appointment    Patient ID: Sivakumar Combs is a 59 y.o. female who presents for Diabetes.    Pt is here for Follow Up appointment.     Referring Provider: Yoko Mitchell MD  PCP: Yoko Mitchell MD   Last visit with PCP: 9/19/2024   Next visit with PCP: 10/17/2025      Subjective     Interval History  A1c is down to 7.1%- may be at goal given recent medication adjustments  Happy to see the change in A1c but besides her vision does not note many symptom changes    HPI  DIABETES MELLITUS TYPE 2:    Diagnosed with diabetes. Known diabetic complications: possible retinopathy.  Does patient follow with Endocrinology: No- had previously followed in 2023; scheduled again in 1/2025  Eyes: Has seen ophthalmology, they believe no permanent retinopathy and eyes will return to normal; feels vision is improved     Current diabetic medications include:  Mounjaro 7.5 mg once weekly    Clarifications to above regimen: stopped insulin after first dose of Mounjaro  Adverse Effects: does feel she had stomach concerns to first dose of Mounjaro 7.5 mg weekly, second week was better but still rough on her body   Most side effects were on the weekend   Chills and nausea were the side effects    Previous medications: metformin (GI upset even at 1 tablet daily)    Glucose Readings:  Glucometer/CGM Type: glucometer  Patient tests BG 1-3 times per day    Current home BG readings: only 7 readings over 130 in past 5 weeks; most days she has 120s or lower, lowest in 80s  Previous: fasting average in past 4 weeks 143, 134, 135, 118; highest number 160 mg/dL 4 weeks ago, in past week 143 highest, 89 lowest    Any episodes of hypoglycemia? No, no readings appearing low or symptoms, lowest in 80s . Denies symptoms outside changes to vision.  Did patient treat episode of hypoglycemia appropriately? N/A  Does the patient have a prescription for ready-to-use Glucagon? No not currently having low glucose  Does pt have proteinuria?  Problem: Patient/Family Goals  Goal: Patient/Family Long Term Goal  Patient's Long Term Goal: to go home  Interventions: BLOOD CX  -2D ECHO  - See additional Care Plan goals for specific interventions     Outcome: Not Progressing  Awaiting for results of b No    Lifestyle:  Does feel appetite is down  Felt first side effects to Mounjaro on 7.5 mg but willing to continue at this dose    11/14 Hx:  Does feel appetite reduction on the Mounjaro    10/17:  Has been trying to increase exercise and be mindful about it, recently working towards deadline and needing to stay at work longer hours    Hx:  Diet: 3 meals/day. Trying to be mindful of food content. Admits does not get much protein.  BK: eggs and oats  DN: salads previously  Drinks: water or unsweetened tea throughout the day, sometimes coffee but not daily  Physical Activity: has been doing some exercise, trying to increase time per week    Secondary Prevention:  Statin? Yes  ACE-I/ARB? Yes  Aspirin? No    Pertinent PMH Review:  PMH of Pancreatitis: No  PMH of Retinopathy: ophthalmologist does not note retinopathy  PMH of Urinary Tract Infections: recent yeast infection  PMH of MTC: No    Medication Reconciliation:  No reported changes    Drug Interactions  No relevant drug interactions were noted.    Medication System Management  Patient's preferred pharmacy: [facility]  Adherence/Organization: appropriate  Affordability/Accessibility: no current concerns, Mounjaro $20 on test billing      Objective   Allergies   Allergen Reactions    Diphth,Pertus(Acell),Tetanus Unknown    Flu Vac 2022 65up-Tbyjj21o(Pf) Unknown    Flu Vac 2024 65up-[id number](Pf) Other    Glipizide Unknown    Lisinopril Cough    Metformin Unknown    Pneumococcal 23-Jo Ps Vaccine Unknown    Tizanidine Unknown     Extremely dry mouth/difficulty swallowing    Venlafaxine Hallucinations     Flulike sx, felt drugged     Social History     Social History Narrative    Social History:    , 4 kids    Goes by [employer]    Works at [employer]    Nonsmoker    Rare ETOH    ---    Family History:    M: Uterine/cervical CA, IFG/DM, ?HTN    F: CAD (MI age 63), HTN, Hyperlipidemia, DM, Glaucoma, CHF    B: CAD age 40    B:    S:    Maunt: thyroid     "  Medication Review  Current Outpatient Medications   Medication Instructions    cholecalciferol (Vitamin D-3) 50 mcg (2,000 unit) capsule 1 capsule, oral, Daily    gabapentin (NEURONTIN) 100 mg, oral, 3 times daily    insulin syringe-needle U-100 31G X 5/16\" 0.5 mL syringe Use to inject insulin 1 time daily.    levothyroxine (SYNTHROID, LEVOXYL) 25 mcg, oral, Daily before breakfast    Mounjaro 7.5 mg, subcutaneous, Weekly    olmesartan (BENICAR) 20 mg, oral, Daily    pen needle, diabetic (BD Ultra-Fine Micro Pen Needle) 32 gauge x 1/4\" needle Use to inject insulin once daily.    rosuvastatin (CRESTOR) 5 mg, oral, Daily    [START ON 1/6/2025] sodium,potassium,mag sulfates (Suprep Bowel Prep Kit) 17.5-3.13-1.6 gram recon soln solution 1 bottle, oral, 2 times daily, Follow instructions given    vitamin E acid succinate (vitamin E succinate) 268 mg (400 unit) tablet 1-2 tablets, oral, Daily      Vitals  BP Readings from Last 2 Encounters:   09/19/24 120/74   09/18/24 154/70     BMI Readings from Last 1 Encounters:   09/21/24 32.20 kg/m²      Labs  A1C  Lab Results   Component Value Date    HGBA1C 7.1 (H) 12/18/2024    HGBA1C 14.7 (H) 09/18/2024    HGBA1C 8.7 03/22/2023     BMP  Lab Results   Component Value Date    CALCIUM 9.2 09/18/2024     (L) 09/18/2024    K 3.9 09/18/2024    CO2 27 09/18/2024    CL 97 (L) 09/18/2024    BUN 13 09/18/2024    CREATININE 0.85 09/18/2024    EGFR 81 09/18/2024     LFTs  Lab Results   Component Value Date    ALT 70 (H) 09/18/2024    AST 48 (H) 09/18/2024    ALKPHOS 85 09/18/2024    BILITOT 0.6 09/18/2024     FLP  Lab Results   Component Value Date    TRIG 451 (H) 09/18/2024    CHOL 192 09/18/2024    LDLF 53 06/21/2022    LDLCALC  09/18/2024      Comment:      The calculation of LDL and VLDL are inaccurate when the Triglycerides are greater than 400 mg/dL or when the patient is non-fasting. If LDL measurement is necessary contact the testing laboratory for an alternative LDL " assay.                                  Near   Borderline      AGE      Desirable  Optimal    High     High     Very High     0-19 Y     0 - 109     ---    110-129   >/= 130     ----    20-24 Y     0 - 119     ---    120-159   >/= 160     ----      >24 Y     0 -  99   100-129  130-159   160-189     >/=190      HDL 32.0 09/18/2024     Urine Microalbumin  Lab Results   Component Value Date    MICROALBCREA 6.3 10/07/2022     Weight Management  Wt Readings from Last 3 Encounters:   09/21/24 87.8 kg (193 lb 8.3 oz)   09/19/24 87.7 kg (193 lb 4 oz)   09/18/24 90.7 kg (200 lb)      There is no height or weight on file to calculate BMI.     Assessment/Plan   Problem List Items Addressed This Visit       Type 2 diabetes mellitus without complication, without long-term current use of insulin (Multi)     Patient's goal A1c is < 7%.  Is pt at goal? No, however greatly improved from over 14% to 7.1% yesterday. Next A1c likely controlled based on current readings.  Patient's SMBGs are controlled between 80s-130s, with very few above 130.     Rationale for plan: The patient self-discontinued insulin based on improvements in her glucose. She is now only taking Mounjaro 7.5 mg weekly. This has continued to provide controlled readings, this morning at 84 mg/dL. She does admit to significant nausea from first doses of Mounjaro 7.5 mg weekly. She is willing to continue this dose but does not wish to advance at this time. She prefers continuing this dose rather than decreasing to Mounjaro 5 mg. Given she has not reported vomiting and that her second dose was better tolerated than her first dose, and her blood glucose readings are currently controlled, agreeable to continuing Mounjaro 7.5 mg at this time. Will reassess need to continue Mounjaro 7.5 mg versus side effects again in January. Low threshold to decrease dose given well-controlled glucose readings and current side effects.    Medication Changes:  CONTINUE  Mounjaro 7.5 mg  weekly    Monitoring and Education:   Continue monitoring glucose at least daily  Monitor for improvement or worsening of GI side effects         Relevant Orders    Referral to Clinical Pharmacy     Other Visit Diagnoses       Medication management        Relevant Orders    Referral to Clinical Pharmacy                  Clinical Pharmacist follow-up: 1/16 10AM, Telehealth visit    Continue all meds under the continuation of care with the referring provider and clinical pharmacy team.    Thank you,  Kellen Del Real, PharmD  Clinical Pharmacist  [phone]    Verbal consent to manage patient's drug therapy was obtained from the patient. They were informed they may decline to participate or withdraw from participation in pharmacy services at any time.

## 2024-12-27 ENCOUNTER — TELEPHONE (OUTPATIENT)
Dept: GASTROENTEROLOGY | Facility: CLINIC | Age: 59
End: 2024-12-27
Payer: COMMERCIAL

## 2024-12-30 NOTE — TELEPHONE ENCOUNTER
Called patient informed them that Dr. Mitchell is permitting them to stop Mounjaro  7 days prior to procedure on 1/30/25 with Dr. Romano.

## 2025-01-03 ENCOUNTER — APPOINTMENT (OUTPATIENT)
Dept: OPHTHALMOLOGY | Facility: CLINIC | Age: 60
End: 2025-01-03
Payer: COMMERCIAL

## 2025-01-07 ENCOUNTER — APPOINTMENT (OUTPATIENT)
Dept: ENDOCRINOLOGY | Facility: CLINIC | Age: 60
End: 2025-01-07
Payer: COMMERCIAL

## 2025-01-13 ENCOUNTER — ANESTHESIA EVENT (OUTPATIENT)
Dept: GASTROENTEROLOGY | Facility: EXTERNAL LOCATION | Age: 60
End: 2025-01-13

## 2025-01-16 ENCOUNTER — APPOINTMENT (OUTPATIENT)
Dept: PHARMACY | Facility: HOSPITAL | Age: 60
End: 2025-01-16
Payer: COMMERCIAL

## 2025-01-16 DIAGNOSIS — E11.9 TYPE 2 DIABETES MELLITUS WITHOUT COMPLICATION, WITHOUT LONG-TERM CURRENT USE OF INSULIN (MULTI): ICD-10-CM

## 2025-01-16 DIAGNOSIS — Z79.899 MEDICATION MANAGEMENT: ICD-10-CM

## 2025-01-16 RX ORDER — TIRZEPATIDE 5 MG/.5ML
5 INJECTION, SOLUTION SUBCUTANEOUS WEEKLY
Qty: 2 ML | Refills: 11 | Status: SHIPPED | OUTPATIENT
Start: 2025-01-16

## 2025-01-16 NOTE — PROGRESS NOTES
Clinical Pharmacy Appointment    Patient ID: Sivakumar Combs is a 59 y.o. female who presents for Diabetes.    Pt is here for Follow Up appointment.     Referring Provider: Yoko Mitchell MD  PCP: Yoko Mitchell MD   Last visit with PCP: 9/19/2024   Next visit with PCP: 10/17/2025      Subjective     Interval History  A1c is down to 7.1%- may be at goal given recent medication adjustments  Happy to see the change in A1c but besides her vision does not note many symptom changes  To hold 1 dose of Mounjaro a week before 1/30/2025 colonoscopy procedure    HPI  DIABETES MELLITUS TYPE 2:    Diagnosed with diabetes. Known diabetic complications: possible retinopathy.  Does patient follow with Endocrinology: No- had previously followed in 2023; scheduled again in 1/2025  Eyes: Has seen ophthalmology, they believe no permanent retinopathy and eyes will return to normal; feels vision is improved     Current diabetic medications include:  Mounjaro 7.5 mg once weekly    Clarifications to above regimen: stopped insulin after first dose of Mounjaro 7.5  Adverse Effects: history of GI side effects to first month Mounjaro 7.5 mg, still having feeling of chills    Previous medications: metformin (GI upset even at 1 tablet daily)    Glucose Readings:  Glucometer/CGM Type: glucometer  Patient tests BG 1-3 times per day    Current home BG readings: Avg is 108 7-day, 103 14-day, 30-day 104  Previous: only 7 readings over 130 in past 5 weeks; most days she has 120s or lower, lowest in 80s    Any episodes of hypoglycemia? No, no readings appearing low or symptoms, lowest in 80s . Denies symptoms outside changes to vision.  Did patient treat episode of hypoglycemia appropriately? N/A  Does the patient have a prescription for ready-to-use Glucagon? No not currently having low glucose  Does pt have proteinuria? No    Lifestyle:  Does feel that she has enough appetite  Sometimes chooses more carb-heavy foods, needs to work on this more  (pizza, banana the other day)    12/2024:  Does feel appetite is down  Felt first side effects to Mounjaro on 7.5 mg but willing to continue at this dose    11/14 Hx:  Does feel appetite reduction on the Mounjaro    10/17:  Has been trying to increase exercise and be mindful about it, recently working towards deadline and needing to stay at work longer hours    Hx:  Diet: 3 meals/day. Trying to be mindful of food content. Admits does not get much protein.  BK: eggs and oats  DN: salads previously  Drinks: water or unsweetened tea throughout the day, sometimes coffee but not daily  Physical Activity: has been doing some exercise, trying to increase time per week    Secondary Prevention:  Statin? Yes  ACE-I/ARB? Yes  Aspirin? No    Pertinent PMH Review:  PMH of Pancreatitis: No  PMH of Retinopathy: ophthalmologist does not note retinopathy  PMH of Urinary Tract Infections: recent yeast infection  PMH of MTC: No    Medication Reconciliation:  No reported changes    Drug Interactions  No relevant drug interactions were noted.    Medication System Management  Patient's preferred pharmacy: [facility]  Adherence/Organization: appropriate  Affordability/Accessibility: no current concerns, Mounjaro $20 on test billing      Objective   Allergies   Allergen Reactions    Diphth,Pertus(Acell),Tetanus Unknown    Flu Vac 2022 65up-Khkay56o(Pf) Unknown    Flu Vac 2024 65up-Tppef35w(Pf) Other    Glipizide Unknown    Lisinopril Cough    Metformin Unknown    Pneumococcal 23-Jo Ps Vaccine Unknown    Tizanidine Unknown     Extremely dry mouth/difficulty swallowing    Venlafaxine Hallucinations     Flulike sx, felt drugged     Social History     Social History Narrative    Social History:    , 4 kids    Goes by [employer]    Works at [employer]    Nonsmoker    Rare ETOH    ---    Family History:    M: Uterine/cervical CA, IFG/DM, ?HTN    F: CAD (MI age 63), HTN, Hyperlipidemia, DM, Glaucoma, CHF    B: CAD age 40    B:     "S:    Maunt: thyroid      Medication Review  Current Outpatient Medications   Medication Instructions    cholecalciferol (Vitamin D-3) 50 mcg (2,000 unit) capsule 1 capsule, oral, Daily    gabapentin (NEURONTIN) 100 mg, oral, 3 times daily    insulin syringe-needle U-100 31G X 5/16\" 0.5 mL syringe Use to inject insulin 1 time daily.    levothyroxine (SYNTHROID, LEVOXYL) 25 mcg, oral, Daily before breakfast    Mounjaro 5 mg, subcutaneous, Weekly    olmesartan (BENICAR) 20 mg, oral, Daily    pen needle, diabetic (BD Ultra-Fine Micro Pen Needle) 32 gauge x 1/4\" needle Use to inject insulin once daily.    rosuvastatin (CRESTOR) 5 mg, oral, Daily    vitamin E acid succinate (vitamin E succinate) 268 mg (400 unit) tablet 1-2 tablets, oral, Daily      Vitals  BP Readings from Last 2 Encounters:   09/19/24 120/74   09/18/24 154/70     BMI Readings from Last 1 Encounters:   09/21/24 32.20 kg/m²      Labs  A1C  Lab Results   Component Value Date    HGBA1C 7.1 (H) 12/18/2024    HGBA1C 14.7 (H) 09/18/2024    HGBA1C 8.7 03/22/2023     BMP  Lab Results   Component Value Date    CALCIUM 9.2 09/18/2024     (L) 09/18/2024    K 3.9 09/18/2024    CO2 27 09/18/2024    CL 97 (L) 09/18/2024    BUN 13 09/18/2024    CREATININE 0.85 09/18/2024    EGFR 81 09/18/2024     LFTs  Lab Results   Component Value Date    ALT 70 (H) 09/18/2024    AST 48 (H) 09/18/2024    ALKPHOS 85 09/18/2024    BILITOT 0.6 09/18/2024     FLP  Lab Results   Component Value Date    TRIG 451 (H) 09/18/2024    CHOL 192 09/18/2024    LDLF 53 06/21/2022    LDLCALC  09/18/2024      Comment:      The calculation of LDL and VLDL are inaccurate when the Triglycerides are greater than 400 mg/dL or when the patient is non-fasting. If LDL measurement is necessary contact the testing laboratory for an alternative LDL assay.                                  Near   Borderline      AGE      Desirable  Optimal    High     High     Very High     0-19 Y     0 - 109     ---    " 110-129   >/= 130     ----    20-24 Y     0 - 119     ---    120-159   >/= 160     ----      >24 Y     0 -  99   100-129  130-159   160-189     >/=190      HDL 32.0 09/18/2024     Urine Microalbumin  Lab Results   Component Value Date    MICROALBCREA 6.3 10/07/2022     Weight Management  Wt Readings from Last 3 Encounters:   09/21/24 87.8 kg (193 lb 8.3 oz)   09/19/24 87.7 kg (193 lb 4 oz)   09/18/24 90.7 kg (200 lb)      There is no height or weight on file to calculate BMI.     Assessment/Plan   Problem List Items Addressed This Visit       Type 2 diabetes mellitus without complication, without long-term current use of insulin (Multi)     Patient's goal A1c is < 7%.  Is pt at goal? No, however close to goal with A1c 7.1%  Patient's SMBGs are controlled in low 100s.     Rationale for plan: The patient is still experiencing chill-like side effects on her current dose of Mounjaro 7.5 mg weekly. This has occurred for 2 months at this point. Her glucose is controlled very well without lows. Given this, will attempt to decrease Mounjaro dose to 5 mg once weekly without additional medications. We reviewed that if her average fasting glucose increases to above 130, we may need to add an additional medication at a later time.    Medication Changes:  DECREASE  Mounjaro to 5 mg once weekly    Future Considerations:  Can consider addition of SGLT2i if her glucose increases on lower dose of Mounjaro (failed metformin and AUGUST, TZD not preferred given diastolic dysfunction)    Monitoring and Education:   Continue monitoring daily fasting glucose  Watch for resolution of chill side effect         Relevant Medications    tirzepatide (Mounjaro) 5 mg/0.5 mL pen injector    Other Relevant Orders    Referral to Clinical Pharmacy     Other Visit Diagnoses       Medication management        Relevant Orders    Referral to Clinical Pharmacy                    Clinical Pharmacist follow-up: 2/13 9:40AM, Telehealth visit    Continue all meds  under the continuation of care with the referring provider and clinical pharmacy team.    Thank you,  Kellen Del Real, PharmD  Clinical Pharmacist  [phone]    Verbal consent to manage patient's drug therapy was obtained from the patient. They were informed they may decline to participate or withdraw from participation in pharmacy services at any time.

## 2025-01-16 NOTE — ASSESSMENT & PLAN NOTE
Patient's goal A1c is < 7%.  Is pt at goal? No, however close to goal with A1c 7.1%  Patient's SMBGs are controlled in low 100s.     Rationale for plan: The patient is still experiencing chill-like side effects on her current dose of Mounjaro 7.5 mg weekly. This has occurred for 2 months at this point. Her glucose is controlled very well without lows. Given this, will attempt to decrease Mounjaro dose to 5 mg once weekly without additional medications. We reviewed that if her average fasting glucose increases to above 130, we may need to add an additional medication at a later time.    Medication Changes:  DECREASE  Mounjaro to 5 mg once weekly    Future Considerations:  Can consider addition of SGLT2i if her glucose increases on lower dose of Mounjaro (failed metformin and AUGUST, TZD not preferred given diastolic dysfunction)    Monitoring and Education:   Continue monitoring daily fasting glucose  Watch for resolution of chill side effect

## 2025-01-17 ENCOUNTER — APPOINTMENT (OUTPATIENT)
Dept: OPHTHALMOLOGY | Facility: CLINIC | Age: 60
End: 2025-01-17
Payer: COMMERCIAL

## 2025-01-17 DIAGNOSIS — H40.1211 LOW TENSION GLAUCOMA OF RIGHT EYE, MILD STAGE: ICD-10-CM

## 2025-01-17 DIAGNOSIS — H40.003 GLAUCOMA SUSPECT OF BOTH EYES: Primary | ICD-10-CM

## 2025-01-17 PROCEDURE — 92083 EXTENDED VISUAL FIELD XM: CPT | Performed by: OPHTHALMOLOGY

## 2025-01-17 PROCEDURE — 92133 CPTRZD OPH DX IMG PST SGM ON: CPT | Performed by: OPHTHALMOLOGY

## 2025-01-17 PROCEDURE — 99214 OFFICE O/P EST MOD 30 MIN: CPT | Performed by: OPHTHALMOLOGY

## 2025-01-17 ASSESSMENT — GONIOSCOPY
OS_NASAL: D45F 2+
OS_SUPERIOR: D45F 2+
OD_INFERIOR: D45F 2+
OS_TEMPORAL: D45F 2+
OS_INFERIOR: D45F 2+
OD_NASAL: D45F 2+
OD_SUPERIOR: D45F 2+
OD_TEMPORAL: D45F 2+

## 2025-01-17 ASSESSMENT — CONF VISUAL FIELD
OS_INFERIOR_NASAL_RESTRICTION: 0
OS_SUPERIOR_NASAL_RESTRICTION: 0
OD_NORMAL: 1
OD_INFERIOR_NASAL_RESTRICTION: 0
OS_SUPERIOR_TEMPORAL_RESTRICTION: 0
OS_INFERIOR_TEMPORAL_RESTRICTION: 0
OD_SUPERIOR_TEMPORAL_RESTRICTION: 0
OD_INFERIOR_TEMPORAL_RESTRICTION: 0
OD_SUPERIOR_NASAL_RESTRICTION: 0
OS_NORMAL: 1

## 2025-01-17 ASSESSMENT — SLIT LAMP EXAM - LIDS
COMMENTS: NORMAL
COMMENTS: NORMAL

## 2025-01-17 ASSESSMENT — EXTERNAL EXAM - RIGHT EYE: OD_EXAM: NORMAL

## 2025-01-17 ASSESSMENT — VISUAL ACUITY
METHOD: SNELLEN - LINEAR
OS_SC: 20/20-1
OD_SC: 20/20

## 2025-01-17 ASSESSMENT — TONOMETRY
IOP_METHOD: GOLDMANN APPLANATION
OS_IOP_MMHG: 17
OD_IOP_MMHG: 18

## 2025-01-17 ASSESSMENT — PACHYMETRY
OS_CT(UM): 568
OD_CT(UM): 562

## 2025-01-17 ASSESSMENT — ENCOUNTER SYMPTOMS: EYES NEGATIVE: 1

## 2025-01-17 ASSESSMENT — CUP TO DISC RATIO: OS_RATIO: 0.6, EVEN RIMS

## 2025-01-17 ASSESSMENT — EXTERNAL EXAM - LEFT EYE: OS_EXAM: NORMAL

## 2025-01-17 NOTE — PROGRESS NOTES
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 Fry 10/2024 for Bleph/chalazion left lower lid (LLL) - Since pt has finished Rx treatment. Sill doing WC. BS - 103. A1c - 7.1%. States she only wears readers. No current visual acuity (VA) issues. No flashes.   Last edited by Zoie Colon on 1/17/2025  2:05 PM.        Problem List  Date Reviewed: 10/8/2024      Routine adult health maintenance    H/O thyroid nodule    BMI 32.0-32.9,adult    History of colon polyps    Diastolic dysfunction, left ventricle    Diverticulosis of colon    Fatty liver    Grade I hemorrhoids    Hypertension, essential    Hypothyroidism    Osteoarthritis of left knee    Leiomyoma    Migraine without status migrainosus, not intractable    Mixed hyperlipidemia    Rosacea    Type 2 diabetes mellitus without complication, without long-term current use of insulin (Multi)    Vitamin D deficiency    Screening for osteoporosis    Encounter for screening mammogram for breast cancer    Screening for colon cancer    Spondylosis of cervical region without myelopathy or radiculopathy    Cervical disc disease    Dysphagia    Cervical radiculopathy    Symptomatic menopausal or female climacteric states    Chalazion of left lower eyelid    Meibomian gland disease    Inflammatory lesion of eyelid       Past Medical History:   Diagnosis Date    H/O abdominal ultrasound     12/16: Fatty infiltration of the liver, without focal hepatic lesion.    H/O cardiovascular stress test     Stress ECHO: 12/16: normal, stage 1 DD    H/O cervical spine x-ray     2019: There is disc space narrowing lower cervical spine at C5/C6 where there are anterior  endplate osteophyte formation    H/O chest x-ray 09/18/2024    normal    H/O CT scan     7/22: CT calcium score: 0 Short/small hiatal hernia including the gastroesophageal junction    H/O diagnostic ultrasound     US thyroid:6/20: IMPRESSION: Nonvisualization of  the previously noted left thyroid lobe nodule, likely represented heterogeneity in the gland 12/19: An approximate 2.8 x 1.5 x 1.4 cm slightly heterogeneous overall hypoechoic nodule is present at the midpole. This nodule is mildly suspicious given these characteristics, and FNA, or relative short-term follow-up would be recommended    H/O diagnostic ultrasound 10/09/2024    Fibroscan: Fibrosis score is consistent  with Metavir F1. The CAP score is consistent with 0 - 5% hepatocyte  steatosis (steatosis stage 0 of 3).    H/O magnetic resonance imaging of cervical spine     2016:  Disc herniations C3-4 and C6-7     Past Surgical History:   Procedure Laterality Date    BLADDER NECK SUSPENSION      Ydioijoe-Gnbkhzwqq-Wmupsg procedure    CHOLECYSTECTOMY  10/26/2018    Cholecystectomy    COLONOSCOPY  07/01/2020 6/20: - Diverticulosis in the entire examined colon.                     - External hemorrhoids.                     - One 10 mm polyp at the appendiceal orifice, removed                     with a jumbo cold forceps. Resected and retrieved.    HYSTERECTOMY  10/26/2018    Hysterectomy, TAHBS and Dovzzhyt-Ktcgsnern-Xovrxz procedure. Ovaries remain    OTHER SURGICAL HISTORY  08/28/2020    Lateral meniscus repair    OTHER SURGICAL HISTORY  06/24/2021    Left knee scope in August 2020, chondral defect, near full thickness, medial weightbearing surface of the medial femoral condyle.     SOCIAL HISTORY   SMOKING:  reports that she has never smoked. She has never used smokeless tobacco.  DRUG USE:    reports no history of drug use.    FAMILY HISTORY  family history includes Cervical cancer in her mother; Coronary artery disease in her brother and father; Diabetes in her father; Glaucoma in her father and mother; Heart failure in her father; Hyperlipidemia in her father; Hypertension in her father and mother; Uterine cancer in her mother; thyroid in her mother's sister.    CURRENT MEDICATIONS  No current outpatient  "medications on file. (Ophthalmology pharm classes)       Current Outpatient Medications (Other)   Medication Sig Dispense Refill    cholecalciferol (Vitamin D-3) 50 mcg (2,000 unit) capsule Take 1 capsule (50 mcg) by mouth once daily.      gabapentin (Neurontin) 100 mg capsule Take 1 capsule (100 mg) by mouth 3 times a day. 270 capsule 3    insulin syringe-needle U-100 31G X 5/16\" 0.5 mL syringe Use to inject insulin 1 time daily. 100 each 3    levothyroxine (Synthroid, Levoxyl) 25 mcg tablet Take 1 tablet (25 mcg) by mouth once daily in the morning. Take before meals. 90 tablet 3    olmesartan (BENIcar) 20 mg tablet Take 1 tablet (20 mg) by mouth once daily. 90 tablet 3    pen needle, diabetic (BD Ultra-Fine Micro Pen Needle) 32 gauge x 1/4\" needle Use to inject insulin once daily. 100 each 1    rosuvastatin (Crestor) 5 mg tablet Take 1 tablet (5 mg) by mouth once daily. 90 tablet 3    tirzepatide (Mounjaro) 5 mg/0.5 mL pen injector Inject 5 mg under the skin 1 (one) time per week. 2 mL 11    vitamin E acid succinate (vitamin E succinate) 268 mg (400 unit) tablet Take 1-2 tablets by mouth once daily.         Exam   Visual Acuity (Snellen - Linear)         Right Left    Dist sc 20/20 20/20-1              Edited by: Zoie Colon              Not recorded       Not recorded       Pupils       Pupils         Pupils    Right PERRL, No APD    Left PERRL, No APD                  Intraocular pressure was 18 in the right eye and 17 in the left eye using Goldmann Applanation.  Extraocular Movement       Extraocular Movement         Right Left     Full Full                  Pachymetry       Pachymetry (1/17/2025)         Right Left    Thickness 562 [id number]                  Gonioscopy       Gonioscopy         Right Left    Temporal d45f 2+ d45f 2+    Nasal d45f 2+ d45f 2+    Superior d45f 2+ d45f 2+    Inferior d45f 2+ d45f 2+                   External Exam         Right Left    External Normal Normal              Slit Lamp " "Exam         Right Left    Lids/Lashes Normal Normal    Conjunctiva/Sclera White and quiet White and quiet    Cornea Clear Clear    Anterior Chamber Deep and quiet Deep and quiet    Iris Round and reactive Round and reactive    Lens 1+nsc 1+nsc    Anterior Vitreous Normal Normal              Fundus Exam         Right Left    Disc Normal Normal    C/D Ratio 0.75, thin superiorly 0.6, even rims    Macula Normal Normal                   <div id=\"MAIN_EXAM_REVIEWED\"></div>       Diagnostics  Joy Visual Field - OU - Both Eyes          Early inferior arcuate OD, correlates with oct although mostly end points  Normal OS         OCT, Optic Nerve - OU - Both Eyes          Asymmetric thinning superiorly OD, correlates with appearance of nerve and field             Plan   -  The primary encounter diagnosis was Glaucoma suspect of both eyes. A diagnosis of Low tension glaucoma of right eye, mild stage was also pertinent to this visit.  -  Referred By:  Radha Bianchi MD  -  IOP:  Last Tonometry OD 18 / OS 17 /Date 2:10 PM      Target OD: No Value exists for the : EPIC#WYG400 Target OS: No Value exists for the : EPIC#QWD979       Max pressure OD:   Date:         Max Pressure OS:   Date:    -    Gonioscopy       Gonioscopy         Right Left    Temporal d45f 2+ d45f 2+    Nasal d45f 2+ d45f 2+    Superior d45f 2+ d45f 2+    Inferior d45f 2+ d45f 2+                    -    Pachymetry       Pachymetry (1/17/2025)         Right Left    Thickness 562 568                  -  Pathophysiology of glaucoma, and potential blinding nature of disease reviewed.      Importance of follow up and compliance stressed  -+family hx of glaucoma in father  +ROS for NTG, cold hands, hx of migraines  Structure function correlation on field/oct  Recommend tx, d/w pt option of gtt vs SLT, patient chose SLT  Will schedule OD thenf/u      "

## 2025-01-30 ENCOUNTER — APPOINTMENT (OUTPATIENT)
Dept: GASTROENTEROLOGY | Facility: EXTERNAL LOCATION | Age: 60
End: 2025-01-30
Payer: COMMERCIAL

## 2025-01-30 ENCOUNTER — ANESTHESIA (OUTPATIENT)
Dept: GASTROENTEROLOGY | Facility: EXTERNAL LOCATION | Age: 60
End: 2025-01-30

## 2025-01-30 VITALS
SYSTOLIC BLOOD PRESSURE: 107 MMHG | OXYGEN SATURATION: 100 % | RESPIRATION RATE: 11 BRPM | HEART RATE: 85 BPM | TEMPERATURE: 96.8 F | DIASTOLIC BLOOD PRESSURE: 73 MMHG | WEIGHT: 180 LBS | HEIGHT: 65 IN | BODY MASS INDEX: 29.99 KG/M2

## 2025-01-30 DIAGNOSIS — Z12.11 SCREENING FOR COLON CANCER: ICD-10-CM

## 2025-01-30 PROBLEM — H00.15 CHALAZION OF LEFT LOWER EYELID: Status: RESOLVED | Noted: 2024-10-08 | Resolved: 2025-01-30

## 2025-01-30 PROCEDURE — 45380 COLONOSCOPY AND BIOPSY: CPT | Performed by: INTERNAL MEDICINE

## 2025-01-30 RX ORDER — ONDANSETRON HYDROCHLORIDE 2 MG/ML
4 INJECTION, SOLUTION INTRAVENOUS ONCE AS NEEDED
Status: DISCONTINUED | OUTPATIENT
Start: 2025-01-30 | End: 2025-01-31 | Stop reason: HOSPADM

## 2025-01-30 RX ORDER — LIDOCAINE HYDROCHLORIDE 20 MG/ML
INJECTION, SOLUTION INFILTRATION; PERINEURAL AS NEEDED
Status: DISCONTINUED | OUTPATIENT
Start: 2025-01-30 | End: 2025-01-30

## 2025-01-30 RX ORDER — PROPOFOL 10 MG/ML
INJECTION, EMULSION INTRAVENOUS AS NEEDED
Status: DISCONTINUED | OUTPATIENT
Start: 2025-01-30 | End: 2025-01-30

## 2025-01-30 RX ADMIN — PROPOFOL 30 MG: 10 INJECTION, EMULSION INTRAVENOUS at 08:06

## 2025-01-30 RX ADMIN — PROPOFOL 30 MG: 10 INJECTION, EMULSION INTRAVENOUS at 08:08

## 2025-01-30 RX ADMIN — PROPOFOL 30 MG: 10 INJECTION, EMULSION INTRAVENOUS at 08:10

## 2025-01-30 RX ADMIN — LIDOCAINE HYDROCHLORIDE 2 ML: 20 INJECTION, SOLUTION INFILTRATION; PERINEURAL at 08:01

## 2025-01-30 RX ADMIN — PROPOFOL 20 MG: 10 INJECTION, EMULSION INTRAVENOUS at 08:16

## 2025-01-30 RX ADMIN — PROPOFOL 100 MG: 10 INJECTION, EMULSION INTRAVENOUS at 08:01

## 2025-01-30 RX ADMIN — PROPOFOL 20 MG: 10 INJECTION, EMULSION INTRAVENOUS at 08:14

## 2025-01-30 RX ADMIN — PROPOFOL 50 MG: 10 INJECTION, EMULSION INTRAVENOUS at 08:04

## 2025-01-30 RX ADMIN — PROPOFOL 20 MG: 10 INJECTION, EMULSION INTRAVENOUS at 08:12

## 2025-01-30 RX ADMIN — PROPOFOL 50 MG: 10 INJECTION, EMULSION INTRAVENOUS at 08:02

## 2025-01-30 SDOH — HEALTH STABILITY: MENTAL HEALTH: CURRENT SMOKER: 0

## 2025-01-30 ASSESSMENT — PAIN SCALES - GENERAL
PAINLEVEL_OUTOF10: 0 - NO PAIN
PAINLEVEL_OUTOF10: 0 - NO PAIN
PAIN_LEVEL: 0

## 2025-01-30 ASSESSMENT — PAIN - FUNCTIONAL ASSESSMENT
PAIN_FUNCTIONAL_ASSESSMENT: 0-10

## 2025-01-30 NOTE — DISCHARGE INSTRUCTIONS
Patient Instructions Post Endoscopy Procedure      The anesthetics, sedatives or narcotics which were given to you today will be acting in your body for the next 24 hours, so you might feel a little sleepy or groggy.  This feeling should slowly wear off. Carefully read and follow the instructions.     You received sedation today:  - Do not drive or operate any machinery or power tools of any kind.   - No alcoholic beverages today, not even beer or wine.  - Do not make any important decisions or sign any legal documents.  - No over the counter medications that contain alcohol or that may cause drowsiness.    While it is common to experience mild to moderate abdominal distention, gas, or belching after your procedure, if any of these symptoms occur following discharge from the GI Lab or within one week of having your procedure, call the [facility] to be advised whether a visit to your nearest Urgent Care or Emergency Department is indicated.  Take this paper with you if you go.   - If you develop an allergic reaction to the medications that were given during your procedure such as difficulty breathing, rash, hives, severe nausea, vomiting or lightheadedness.  - If you experience chest pain, shortness of breath, severe abdominal pain, fevers and chills.  -If you develop signs and symptoms of bleeding such as blood in your spit, if your stools turn black, tarry, or bloody  - If you have not urinated within 8 hours following your procedure.  - If your IV site becomes painful, red, inflamed, or looks infected.    Your physician recommends the additional following instructions:    -You have a contact number available for emergencies. The signs and symptoms of potential delayed complications were discussed with you. You may return to normal activities tomorrow.  -Resume your previous diet or other if specified.  -Continue your present medications.   -We are waiting for your pathology results, if applicable.  The results will be available in [employer]. I will send you a message with any recommendations.  -The findings and recommendations have been discussed with you and/or family.  -Please see Medication Reconciliation Form for new medication/medications prescribed.     If you experience any problems or have any questions following discharge from the GI Lab, please call: [phone] from 7 am- 4:30 pm.  In the event of an emergency please go to the closest Emergency Department or call Dr. Romano at [phone]

## 2025-01-30 NOTE — ANESTHESIA PREPROCEDURE EVALUATION
Patient: Sivakumar Combs    Procedure Information       Anesthesia Start Date/Time: 01/30/25 0759    Scheduled providers: Marli BORDEN MD    Procedure: COLONOSCOPY    Location: [facility]            Relevant Problems   Cardiac   (+) Hypertension, essential   (+) Mixed hyperlipidemia      Neuro   (+) Cervical radiculopathy      GI   (+) Dysphagia      Liver   (+) Fatty liver      Endocrine   (+) Hypothyroidism   (+) Type 2 diabetes mellitus without complication, without long-term current use of insulin (Multi)      Musculoskeletal   (+) Osteoarthritis of left knee   (+) Spondylosis of cervical region without myelopathy or radiculopathy       Clinical information reviewed:   Tobacco  Allergies  Meds   Med Hx  Surg Hx  OB Status  Fam Hx  Soc   Hx        NPO Detail:  NPO/Void Status  Carbohydrate Drink Given Prior to Surgery? : N  Date of Last Liquid: 01/30/25  Time of Last Liquid: 0400  Date of Last Solid: 01/28/25  Last Intake Type: Clear fluids         Physical Exam    Airway  Mallampati: II  TM distance: >3 FB     Cardiovascular - normal exam     Dental - normal exam     Pulmonary - normal exam     Abdominal   (+) obese         Anesthesia Plan    History of general anesthesia?: yes  History of complications of general anesthesia?: no    ASA 2     MAC     The patient is not a current smoker.  Patient did not smoke on day of procedure.    intravenous induction   Anesthetic plan and risks discussed with patient.

## 2025-01-30 NOTE — H&P
Outpatient Hospital Procedure    Patient Profile-Procedures  Initial Info  Patient Demographics  Name Sivakumar Combs  Date of Birth 1965  MRN [id number]  Address   [address] [phone] [facility] [facility] [address]    Primary Phone Number [phone]  Secondary Phone Number    PCP Yoko Mitchell    Procedures   Colonoscopy      Indication:  Surveillance - SSA     Primary contact name and number   Extended Emergency Contact Information  Primary Emergency Contact: Ivory Combs  Mobile Phone: [phone]  Relation: Spouse  Preferred language: English   needed? No    General Health  Weight   Vitals:    01/30/25 0723   Weight: 81.6 kg (180 lb)     BMI Body mass index is 29.95 kg/m².    Allergies  Allergies   Allergen Reactions    Diphth,Pertus(Acell),Tetanus Unknown    Flu Vac 2022 65up-Irlrb18a(Pf) Unknown    Flu Vac 2024 65up-Xccbp49q(Pf) Other    Glipizide Unknown    Lisinopril Cough    Metformin Unknown    Pneumococcal 23-Jo Ps Vaccine Unknown    Tizanidine Unknown     Extremely dry mouth/difficulty swallowing    Venlafaxine Hallucinations     Flulike sx, felt drugged       Past Medical History   Past Medical History:   Diagnosis Date    DM (diabetes mellitus) (Multi)     H/O abdominal ultrasound     12/16: Fatty infiltration of the liver, without focal hepatic lesion.    H/O cardiovascular stress test     Stress ECHO: 12/16: normal, stage 1 DD    H/O cervical spine x-ray     2019: There is disc space narrowing lower cervical spine at C5/C6 where there are anterior  endplate osteophyte formation    H/O chest x-ray 09/18/2024    normal    H/O CT scan     7/22: CT calcium score: 0 Short/small hiatal hernia including the gastroesophageal junction    H/O diagnostic ultrasound     US thyroid:6/20: IMPRESSION: Nonvisualization of the previously noted left thyroid lobe nodule, likely represented heterogeneity in the gland 12/19: An approximate 2.8 x 1.5 x 1.4 cm slightly  "heterogeneous overall hypoechoic nodule is present at the midpole. This nodule is mildly suspicious given these characteristics, and FNA, or relative short-term follow-up would be recommended    H/O diagnostic ultrasound 10/09/2024    Fibroscan: Fibrosis score is consistent  with Metavir F1. The CAP score is consistent with 0 - 5% hepatocyte  steatosis (steatosis stage 0 of 3).    H/O magnetic resonance imaging of cervical spine     2016:  Disc herniations C3-4 and C6-7    Hyperlipidemia     Hypertension     Hypothyroid        Provider assessment  Diagnosis  Medication Reviewed - yes  Prior to Admission medications    Medication Sig Start Date End Date Taking? Authorizing Provider   cholecalciferol (Vitamin D-3) 50 mcg (2,000 unit) capsule Take 1 capsule (50 mcg) by mouth once daily. 10/20/21  Yes Historical Provider, MD   gabapentin (Neurontin) 100 mg capsule Take 1 capsule (100 mg) by mouth 3 times a day. 9/19/24  Yes Yoko Mitchell MD   levothyroxine (Synthroid, Levoxyl) 25 mcg tablet Take 1 tablet (25 mcg) by mouth once daily in the morning. Take before meals. 9/19/24  Yes Yoko Mitchell MD   olmesartan (BENIcar) 20 mg tablet Take 1 tablet (20 mg) by mouth once daily. 9/19/24 9/19/25 Yes Yoko Mitchell MD   rosuvastatin (Crestor) 5 mg tablet Take 1 tablet (5 mg) by mouth once daily. 9/19/24 9/19/25 Yes Yoko Mitchell MD   tirzepatide (Mounjaro) 5 mg/0.5 mL pen injector Inject 5 mg under the skin 1 (one) time per week. 1/16/25  Yes Yoko Mitchell MD   vitamin E acid succinate (vitamin E succinate) 268 mg (400 unit) tablet Take 1-2 tablets by mouth once daily. 11/1/18  Yes Historical Provider, MD   insulin syringe-needle U-100 31G X 5/16\" 0.5 mL syringe Use to inject insulin 1 time daily. 9/30/24 9/26/25  Marleny Peterson, APRN-CNP   pen needle, diabetic (BD Ultra-Fine Micro Pen Needle) 32 gauge x 1/4\" needle Use to inject insulin once daily. 9/26/24   Yoko Mitchell MD       This is my H&P    Physical " Exam  Physical Exam  Constitutional:       Comments: Awake   HENT:      Head: Normocephalic.   Cardiovascular:      Rate and Rhythm: Normal rate and regular rhythm.   Pulmonary:      Effort: Pulmonary effort is normal.      Breath sounds: Normal breath sounds.   Abdominal:      General: Bowel sounds are normal.      Palpations: Abdomen is soft.   Neurological:      Mental Status: She is alert.   Psychiatric:         Mood and Affect: Mood normal.           Oropharyngeal Classification II (hard and soft palate, upper portion of tonsils and uvula visible)  ASA PS Classification 2  Sedation Plan Deep  Procedure Plan - pre-procedural (re)assesment completed by physician:  discharge/transfer patient when discharge criteria met    Marli Romano MD  1/30/2025 7:59 AM

## 2025-01-30 NOTE — ANESTHESIA POSTPROCEDURE EVALUATION
Patient: Sivakumar Combs    Procedure Summary       Date: 01/30/25 Room / Location: [facility]    Anesthesia Start: 0759 Anesthesia Stop: 0821    Procedure: COLONOSCOPY Diagnosis: Screening for colon cancer    Scheduled Providers: Marli BORDEN MD Responsible Provider: GENE Ha    Anesthesia Type: MAC ASA Status: 2            Anesthesia Type: MAC    Vitals Value Taken Time   /57 01/30/25 0819   Temp 36 °C (96.8 °F) 01/30/25 0819   Pulse 87 01/30/25 0819   Resp 12 01/30/25 0819   SpO2 99 % 01/30/25 0819       Anesthesia Post Evaluation    Patient location during evaluation: PACU  Patient participation: waiting for patient participation  Level of consciousness: responsive to verbal stimuli  Pain score: 0  Pain management: adequate  Airway patency: patent  Cardiovascular status: blood pressure returned to baseline  Respiratory status: acceptable  Hydration status: acceptable  Postoperative Nausea and Vomiting: none    No notable events documented.

## 2025-01-31 ENCOUNTER — DOCUMENTATION (OUTPATIENT)
Dept: OPHTHALMOLOGY | Facility: CLINIC | Age: 60
End: 2025-01-31
Payer: COMMERCIAL

## 2025-01-31 NOTE — PROGRESS NOTES
Patient needs to Schedule SLT OD and f/u with Dr. Bianchi.     I have attempted to call patient several times in the last few weeks with no return call back I have also sent message in My chart with no reply. I will schedule when patient reaches back out.     -Mallory

## 2025-02-05 LAB
LABORATORY COMMENT REPORT: NORMAL
PATH REPORT.FINAL DX SPEC: NORMAL
PATH REPORT.GROSS SPEC: NORMAL
PATH REPORT.TOTAL CANCER: NORMAL

## 2025-02-06 ENCOUNTER — APPOINTMENT (OUTPATIENT)
Dept: PHARMACY | Facility: HOSPITAL | Age: 60
End: 2025-02-06
Payer: COMMERCIAL

## 2025-02-13 ENCOUNTER — APPOINTMENT (OUTPATIENT)
Dept: PHARMACY | Facility: HOSPITAL | Age: 60
End: 2025-02-13
Payer: COMMERCIAL

## 2025-02-13 DIAGNOSIS — Z79.899 MEDICATION MANAGEMENT: ICD-10-CM

## 2025-02-13 DIAGNOSIS — E11.9 TYPE 2 DIABETES MELLITUS WITHOUT COMPLICATION, WITHOUT LONG-TERM CURRENT USE OF INSULIN (MULTI): ICD-10-CM

## 2025-02-13 RX ORDER — TIRZEPATIDE 5 MG/.5ML
5 INJECTION, SOLUTION SUBCUTANEOUS WEEKLY
Qty: 6 ML | Refills: 4 | Status: SHIPPED | OUTPATIENT
Start: 2025-02-13

## 2025-02-13 NOTE — ASSESSMENT & PLAN NOTE
Patient's goal A1c is < 7%.  Is pt at goal? No, very near at 7.1%- current readings at goal  Patient's SMBGs are controlled between 100-110 most mornings.     Rationale for plan: The patient is tolerating her current dose of Mounjaro 5 mg better than previous 7.5 mg weekly. She has not seen significant changes to her controlled glucose readings despite dose decrease. She no longer requires insulin therapy for her symptom control. Given she is now stabilized on this dose, will refill 3-month supplies at a time. We will check to ensure her A1c responds as expected and decreases to below 7% in March. After this, can decrease follow-up to every 6 months.    Medication Changes:  CONTINUE  Mounjaro 5 mg weekly    Future Considerations:  Can extend follow-up if controlled    Monitoring and Education:   Continue checking daily fasting glucose  A1c in March 2025 before next visit

## 2025-02-13 NOTE — PROGRESS NOTES
"  Clinical Pharmacy Appointment    Patient ID: Sivakumar Combs is a 59 y.o. female who presents for Diabetes.    Pt is here for Follow Up appointment.     Referring Provider: Yoko Mitchell MD  PCP: Yoko Mitchell MD   Last visit with PCP: 9/19/2024   Next visit with PCP: 10/17/2025    Subjective     Interval History  Colonoscopy 1/30  Last visit, decreased dose of Mounjaro due to GI side effects and chills    HPI  DIABETES MELLITUS TYPE 2:    Diagnosed with diabetes. Known diabetic complications: possible retinopathy.  Does patient follow with Endocrinology: No- had previously followed in 2023; scheduled again in 1/2025  Eyes: Has seen ophthalmology, they believe no permanent retinopathy and eyes will return to normal; feels vision is improved  Podiatry: denies cuts or sores on feet     Current diabetic medications include:  Mounjaro 5 mg once weekly    Clarifications to above regimen: none  Adverse Effects: no longer feeling the chills she felt earlier    Past diabetic medications include:  Metformin (GI upset even XR 1 tablet)    Glucose Readings:  Glucometer/CGM Type: glucometer  Patient tests BG 1 time per day    Current home BG readings (mg/dL): 30-day avg 107  All in the 100-110 range in the morning     Previous home BG readings (mg/dL): Avg is 108 7-day, 103 14-day, 30-day 104     Any episodes of hypoglycemia? No, no readings or symptoms .  Did patient treat episode of hypoglycemia appropriately? N/A  Does the patient have a prescription for ready-to-use Glucagon? Not on insulin    Lifestyle:  Is feeling improved on her current dose of Mounjaro 5 mg weekly  Not concerned about adequate appetite  Weight: last about 180 lbs  Exercise: \"running to work and back\", not recently at gym, will increase in warmer weather  Tobacco history: non-smoker    1/2025:  Does feel that she has enough appetite  Sometimes chooses more carb-heavy foods, needs to work on this more (pizza, banana the other day)     12/2024:  Does " feel appetite is down  Felt first side effects to Mounjaro on 7.5 mg but willing to continue at this dose     11/14 Hx:  Does feel appetite reduction on the Mounjaro     10/17:  Has been trying to increase exercise and be mindful about it, recently working towards deadline and needing to stay at work longer hours     Hx:  Diet: 3 meals/day. Trying to be mindful of food content. Admits does not get much protein.  BK: eggs and oats  DN: salads previously  Drinks: water or unsweetened tea throughout the day, sometimes coffee but not daily  Physical Activity: has been doing some exercise, trying to increase time per week    Secondary Prevention:  Statin? Yes  ACE-I/ARB? Yes  Aspirin? No    Pertinent PMH Review:  PMH of Pancreatitis: No  PMH of Retinopathy: No  PMH of Urinary Tract Infections: No; recent yeast infection  PMH of MTC: No  PMH of MEN2: No  UACR/EGFR in last year?: No; however this is ordered  Albumin/Creatine Ratio   Date Value Ref Range Status   10/07/2022 6.3 0.0 - 30.0 ug/mg crt Final   02/08/2022 7.6 0.0 - 30.0 ug/mg crt Final       Immunizations:  Influenza? No  COVID? Yes  Pneumonia? Yes  Shingles? Yes  Hepatitis B? No     Medication Reconciliation:  Changed: no reported changes    Drug Interactions  No relevant drug interactions were noted.    Medication System Management  Patient's preferred pharmacy: [facility]  Adherence/Organization: appropriate  Affordability/Accessibility: no current concerns, Mounjaro $20 on test billing      Objective   Allergies   Allergen Reactions    Diphth,Pertus(Acell),Tetanus Unknown    Flu Vac 2022 65up-Ldkmb10w(Pf) Unknown    Flu Vac 2024 65up-Gfjnl78q(Pf) Other    Glipizide Unknown    Lisinopril Cough    Metformin Unknown    Pneumococcal 23-Jo Ps Vaccine Unknown    Tizanidine Unknown     Extremely dry mouth/difficulty swallowing    Venlafaxine Hallucinations     Flulike sx, felt drugged     Social History     Social History Narrative    Social History:     ", 4 kids    Goes by [employer]    Works at [employer]    Nonsmoker    Rare ETOH    ---    Family History:    M: Uterine/cervical CA, IFG/DM, ?HTN    F: CAD (MI age 63), HTN, Hyperlipidemia, DM, Glaucoma, CHF    B: CAD age 40    B:    S:    Maunt: thyroid      Medication Review  Current Outpatient Medications   Medication Instructions    cholecalciferol (Vitamin D-3) 50 mcg (2,000 unit) capsule 1 capsule, Daily    gabapentin (NEURONTIN) 100 mg, oral, 3 times daily    insulin syringe-needle U-100 31G X 5/16\" 0.5 mL syringe Use to inject insulin 1 time daily.    levothyroxine (SYNTHROID, LEVOXYL) 25 mcg, oral, Daily before breakfast    Mounjaro 5 mg, subcutaneous, Weekly    olmesartan (BENICAR) 20 mg, oral, Daily    pen needle, diabetic (BD Ultra-Fine Micro Pen Needle) 32 gauge x 1/4\" needle Use to inject insulin once daily.    rosuvastatin (CRESTOR) 5 mg, oral, Daily    vitamin E acid succinate (vitamin E succinate) 268 mg (400 unit) tablet 1-2 tablets, Daily      Vitals  BP Readings from Last 2 Encounters:   01/30/25 107/73   09/19/24 120/74     BMI Readings from Last 1 Encounters:   01/30/25 29.95 kg/m²      Labs  A1C  Lab Results   Component Value Date    HGBA1C 7.1 (H) 12/18/2024    HGBA1C 14.7 (H) 09/18/2024    HGBA1C 8.7 03/22/2023     BMP  Lab Results   Component Value Date    CALCIUM 9.2 09/18/2024     (L) 09/18/2024    K 3.9 09/18/2024    CO2 27 09/18/2024    CL 97 (L) 09/18/2024    BUN 13 09/18/2024    CREATININE 0.85 09/18/2024    EGFR 81 09/18/2024     LFTs  Lab Results   Component Value Date    ALT 70 (H) 09/18/2024    AST 48 (H) 09/18/2024    ALKPHOS 85 09/18/2024    BILITOT 0.6 09/18/2024     FLP  Lab Results   Component Value Date    TRIG 451 (H) 09/18/2024    CHOL 192 09/18/2024    LDLF 53 06/21/2022    LDLCALC  09/18/2024      Comment:      The calculation of LDL and VLDL are inaccurate when the Triglycerides are greater than 400 mg/dL or when the patient is non-fasting. If LDL " measurement is necessary contact the testing laboratory for an alternative LDL assay.                                  Near   Borderline      AGE      Desirable  Optimal    High     High     Very High     0-19 Y     0 - 109     ---    110-129   >/= 130     ----    20-24 Y     0 - 119     ---    120-159   >/= 160     ----      >24 Y     0 -  99   100-129  130-159   160-189     >/=190      HDL 32.0 09/18/2024     Urine Microalbumin  Lab Results   Component Value Date    MICROALBCREA 6.3 10/07/2022     Weight Management  Wt Readings from Last 3 Encounters:   01/30/25 81.6 kg (180 lb)   09/21/24 87.8 kg (193 lb 8.3 oz)   09/19/24 87.7 kg (193 lb 4 oz)      There is no height or weight on file to calculate BMI.     Assessment/Plan   Problem List Items Addressed This Visit       Type 2 diabetes mellitus without complication, without long-term current use of insulin (Multi)     Patient's goal A1c is < 7%.  Is pt at goal? No, very near at 7.1%- current readings at goal  Patient's SMBGs are controlled between 100-110 most mornings.     Rationale for plan: The patient is tolerating her current dose of Mounjaro 5 mg better than previous 7.5 mg weekly. She has not seen significant changes to her controlled glucose readings despite dose decrease. She no longer requires insulin therapy for her symptom control. Given she is now stabilized on this dose, will refill 3-month supplies at a time. We will check to ensure her A1c responds as expected and decreases to below 7% in March. After this, can decrease follow-up to every 6 months.    Medication Changes:  CONTINUE  Mounjaro 5 mg weekly    Future Considerations:  Can extend follow-up if controlled    Monitoring and Education:   Continue checking daily fasting glucose  A1c in March 2025 before next visit         Relevant Medications    tirzepatide (Mounjaro) 5 mg/0.5 mL pen injector    Other Relevant Orders    Hemoglobin A1c    Referral to Clinical Pharmacy     Other Visit Diagnoses        Medication management        Relevant Orders    Referral to Clinical Pharmacy            Clinical Pharmacist follow-up: 3/27 9:40AM, Telehealth visit  Is patient followed in [facility]? no     Continue all meds under the continuation of care with the referring provider and clinical pharmacy team.    Thank you,  Kellen Del Real, PharmD  Clinical Pharmacist  [phone]    Verbal consent to manage patient's drug therapy was obtained from the patient. They were informed they may decline to participate or withdraw from participation in pharmacy services at any time.

## 2025-03-23 LAB
EST. AVERAGE GLUCOSE BLD GHB EST-MCNC: 143 MG/DL
EST. AVERAGE GLUCOSE BLD GHB EST-SCNC: 7.9 MMOL/L
HBA1C MFR BLD: 6.6 % OF TOTAL HGB

## 2025-03-25 DIAGNOSIS — E11.9 TYPE 2 DIABETES MELLITUS WITHOUT COMPLICATION, WITHOUT LONG-TERM CURRENT USE OF INSULIN: ICD-10-CM

## 2025-03-27 ENCOUNTER — APPOINTMENT (OUTPATIENT)
Dept: PHARMACY | Facility: HOSPITAL | Age: 60
End: 2025-03-27
Payer: COMMERCIAL

## 2025-03-27 DIAGNOSIS — E11.9 TYPE 2 DIABETES MELLITUS WITHOUT COMPLICATION, WITHOUT LONG-TERM CURRENT USE OF INSULIN: ICD-10-CM

## 2025-03-27 DIAGNOSIS — Z79.899 MEDICATION MANAGEMENT: ICD-10-CM

## 2025-03-27 NOTE — PROGRESS NOTES
"  Clinical Pharmacy Appointment    Patient ID: Sivakumar Combs is a 59 y.o. female who presents for Diabetes.    Pt is here for Follow Up appointment.     Referring Provider: Yoko Mitchell MD  PCP: Yoko Mitchell MD   Last visit with PCP: 9/19/2024   Next visit with PCP: 10/17/2025    Subjective     Interval History  Repeat A1c completed last week, now at target <7% with A1c of 6.6%    HPI  DIABETES MELLITUS TYPE 2:    Diagnosed with diabetes. Known diabetic complications: possible retinopathy.  Does patient follow with Endocrinology: No- had previously followed in 2023; scheduled again in 1/2025  Eyes: Has seen ophthalmology, they believe no permanent retinopathy and eyes will return to normal; feels vision is improved  Podiatry: denies cuts or sores on feet     Current diabetic medications include:  Mounjaro 5 mg once weekly    Clarifications to above regimen: none  Adverse Effects: no current concerns    Past diabetic medications include:  Metformin (GI upset even XR 1 tablet)    Glucose Readings:  Glucometer/CGM Type: glucometer  Patient tests BG 1 time per day    Current home BG readings (mg/dL): 108 7-day average, 116 14-day average, 115 30-day average fasting     Previous home BG readings (mg/dL): 30-day avg 107  All in the 100-110 range in the morning    Any episodes of hypoglycemia? No, no readings or symptoms .  Did patient treat episode of hypoglycemia appropriately? N/A  Does the patient have a prescription for ready-to-use Glucagon? Not on insulin    Lifestyle:  Definitely notices that stress, especially from work, can increase her readings- but averages are still well within goal  Eating habits have not changed since last visit  Has not been eating cereal in the past many months    2/2025:  Is feeling improved on her current dose of Mounjaro 5 mg weekly  Not concerned about adequate appetite  Weight: last about 180 lbs  Exercise: \"running to work and back\", not recently at gym, will increase in " warmer weather  Tobacco history: non-smoker    1/2025:  Does feel that she has enough appetite  Sometimes chooses more carb-heavy foods, needs to work on this more (pizza, banana the other day)     12/2024:  Does feel appetite is down  Felt first side effects to Mounjaro on 7.5 mg but willing to continue at this dose     11/14 Hx:  Does feel appetite reduction on the Mounjaro     10/17:  Has been trying to increase exercise and be mindful about it, recently working towards deadline and needing to stay at work longer hours     Hx:  Diet: 3 meals/day. Trying to be mindful of food content. Admits does not get much protein.  BK: eggs and oats  DN: salads previously  Drinks: water or unsweetened tea throughout the day, sometimes coffee but not daily  Physical Activity: has been doing some exercise, trying to increase time per week    Secondary Prevention:  Statin? Yes  ACE-I/ARB? Yes  Aspirin? No    Pertinent PMH Review:  PMH of Pancreatitis: No  PMH of Retinopathy: No  PMH of Urinary Tract Infections: No; recent yeast infection  PMH of MTC: No  PMH of MEN2: No  UACR/EGFR in last year?: No; however this is ordered  Albumin/Creatine Ratio   Date Value Ref Range Status   10/07/2022 6.3 0.0 - 30.0 ug/mg crt Final   02/08/2022 7.6 0.0 - 30.0 ug/mg crt Final       Immunizations:  Influenza? No  COVID? Yes  Pneumonia? Yes  Shingles? Yes  Hepatitis B? No     Medication Reconciliation:  Changed: no reported changes    Drug Interactions  No relevant drug interactions were noted.    Medication System Management  Patient's preferred pharmacy: [facility]  Adherence/Organization: appropriate  Affordability/Accessibility: no current concerns, Mounjaro $20 on test billing      Objective   Allergies   Allergen Reactions    Diphth,Pertus(Acell),Tetanus Unknown    Flu Vac 2022 65up-Ipbhc94d(Pf) Unknown    Flu Vac 2024 65up-Pdzwj23c(Pf) Other    Glipizide Unknown    Lisinopril Cough    Metformin Unknown    Pneumococcal 23-Jo Ps  "Vaccine Unknown    Tizanidine Unknown     Extremely dry mouth/difficulty swallowing    Venlafaxine Hallucinations     Flulike sx, felt drugged     Social History     Social History Narrative    Social History:    , 4 kids    Goes by [employer]    Works at [employer]    Nonsmoker    Rare ETOH    ---    Family History:    M: Uterine/cervical CA, IFG/DM, ?HTN    F: CAD (MI age 63), HTN, Hyperlipidemia, DM, Glaucoma, CHF    B: CAD age 40    B:    S:    Maunt: thyroid      Medication Review  Current Outpatient Medications   Medication Instructions    cholecalciferol (Vitamin D-3) 50 mcg (2,000 unit) capsule 1 capsule, Daily    gabapentin (NEURONTIN) 100 mg, oral, 3 times daily    insulin syringe-needle U-100 31G X 5/16\" 0.5 mL syringe Use to inject insulin 1 time daily.    levothyroxine (SYNTHROID, LEVOXYL) 25 mcg, oral, Daily before breakfast    Mounjaro 5 mg, subcutaneous, Weekly    olmesartan (BENICAR) 20 mg, oral, Daily    pen needle, diabetic (BD Ultra-Fine Micro Pen Needle) 32 gauge x 1/4\" needle Use to inject insulin once daily.    rosuvastatin (CRESTOR) 5 mg, oral, Daily    vitamin E acid succinate (vitamin E succinate) 268 mg (400 unit) tablet 1-2 tablets, Daily      Vitals  BP Readings from Last 2 Encounters:   01/30/25 107/73   09/19/24 120/74     BMI Readings from Last 1 Encounters:   01/30/25 29.95 kg/m²      Labs  A1C  Lab Results   Component Value Date    HGBA1C 6.6 (H) 03/22/2025    HGBA1C 7.1 (H) 12/18/2024    HGBA1C 14.7 (H) 09/18/2024     BMP  Lab Results   Component Value Date    CALCIUM 9.2 09/18/2024     (L) 09/18/2024    K 3.9 09/18/2024    CO2 27 09/18/2024    CL 97 (L) 09/18/2024    BUN 13 09/18/2024    CREATININE 0.85 09/18/2024    EGFR 81 09/18/2024     LFTs  Lab Results   Component Value Date    ALT 70 (H) 09/18/2024    AST 48 (H) 09/18/2024    ALKPHOS 85 09/18/2024    BILITOT 0.6 09/18/2024     FLP  Lab Results   Component Value Date    TRIG 451 (H) 09/18/2024    CHOL 192 " 09/18/2024    LDLF 53 06/21/2022    LDLCALC  09/18/2024      Comment:      The calculation of LDL and VLDL are inaccurate when the Triglycerides are greater than 400 mg/dL or when the patient is non-fasting. If LDL measurement is necessary contact the testing laboratory for an alternative LDL assay.                                  Near   Borderline      AGE      Desirable  Optimal    High     High     Very High     0-19 Y     0 - 109     ---    110-129   >/= 130     ----    20-24 Y     0 - 119     ---    120-159   >/= 160     ----      >24 Y     0 -  99   100-129  130-159   160-189     >/=190      HDL 32.0 09/18/2024     Urine Microalbumin  Lab Results   Component Value Date    MICROALBCREA 6.3 10/07/2022     Weight Management  Wt Readings from Last 3 Encounters:   01/30/25 81.6 kg (180 lb)   09/21/24 87.8 kg (193 lb 8.3 oz)   09/19/24 87.7 kg (193 lb 4 oz)      There is no height or weight on file to calculate BMI.     Assessment/Plan   Problem List Items Addressed This Visit       Type 2 diabetes mellitus without complication, without long-term current use of insulin (Multi)     Patient's goal A1c is < 7%.  Is pt at goal? Yes, 6.6% as of 3/22  Patient's SMBGs are controlled, with averages in 100s-110s consistently.     Rationale for plan: The patient is now well-controlled on her Mounjaro and no longer having side effects from the medication. She feels well and is happy with her A1c progress, though she was hoping her A1c was even better. Reminded her that A1c of less than 7% is considered well-controlled and that all her morning readings are within goal of <130. Since the patient is doing so well on current therapy, no further need for regular follow-up PharmD visits. She was instructed to call if questions or concerns arise before her 6-month follow-up with PCP.    Medication Changes:  CONTINUE  Mounjaro 5 mg weekly    Monitoring and Education:   Continue fasting glucose checks daily; goal is <130 for morning  readings  A1c repeat before next PCP visit in October            Other Visit Diagnoses       Medication management              Clinical Pharmacist follow-up: as needed for questions or changes, Telehealth visit  Is patient followed in CCM? no     Continue all meds under the continuation of care with the referring provider and clinical pharmacy team.    Thank you,  Kellen Del Real, PharmD  Clinical Pharmacist  [phone]    Verbal consent to manage patient's drug therapy was obtained from the patient. They were informed they may decline to participate or withdraw from participation in pharmacy services at any time.

## 2025-03-27 NOTE — ASSESSMENT & PLAN NOTE
Patient's goal A1c is < 7%.  Is pt at goal? Yes, 6.6% as of 3/22  Patient's SMBGs are controlled, with averages in 100s-110s consistently.     Rationale for plan: The patient is now well-controlled on her Mounjaro and no longer having side effects from the medication. She feels well and is happy with her A1c progress, though she was hoping her A1c was even better. Reminded her that A1c of less than 7% is considered well-controlled and that all her morning readings are within goal of <130. Since the patient is doing so well on current therapy, no further need for regular follow-up PharmD visits. She was instructed to call if questions or concerns arise before her 6-month follow-up with PCP.    Medication Changes:  CONTINUE  Mounjaro 5 mg weekly    Monitoring and Education:   Continue fasting glucose checks daily; goal is <130 for morning readings  A1c repeat before next PCP visit in October

## 2025-10-17 ENCOUNTER — APPOINTMENT (OUTPATIENT)
Dept: PRIMARY CARE | Facility: CLINIC | Age: 60
End: 2025-10-17
Payer: COMMERCIAL